# Patient Record
Sex: MALE | Race: WHITE | NOT HISPANIC OR LATINO | Employment: OTHER | URBAN - METROPOLITAN AREA
[De-identification: names, ages, dates, MRNs, and addresses within clinical notes are randomized per-mention and may not be internally consistent; named-entity substitution may affect disease eponyms.]

---

## 2017-08-03 PROBLEM — I10 ESSENTIAL HYPERTENSION: Status: ACTIVE | Noted: 2017-08-03

## 2018-04-17 ENCOUNTER — OFFICE VISIT (OUTPATIENT)
Dept: CARDIOLOGY | Facility: CLINIC | Age: 83
End: 2018-04-17
Attending: INTERNAL MEDICINE
Payer: MEDICARE

## 2018-04-17 VITALS
HEART RATE: 70 BPM | BODY MASS INDEX: 25.48 KG/M2 | HEIGHT: 70 IN | DIASTOLIC BLOOD PRESSURE: 90 MMHG | SYSTOLIC BLOOD PRESSURE: 152 MMHG | WEIGHT: 178 LBS

## 2018-04-17 DIAGNOSIS — I48.20 ATRIAL FIBRILLATION, CHRONIC: Primary | ICD-10-CM

## 2018-04-17 DIAGNOSIS — I25.10 CORONARY ARTERY DISEASE INVOLVING NATIVE CORONARY ARTERY OF NATIVE HEART WITHOUT ANGINA PECTORIS: ICD-10-CM

## 2018-04-17 DIAGNOSIS — I49.5 SSS (SICK SINUS SYNDROME): Chronic | ICD-10-CM

## 2018-04-17 DIAGNOSIS — I63.319 CEREBRAL INFARCTION DUE TO THROMBOSIS OF MIDDLE CEREBRAL ARTERY, UNSPECIFIED BLOOD VESSEL LATERALITY: ICD-10-CM

## 2018-04-17 DIAGNOSIS — I10 ESSENTIAL HYPERTENSION: ICD-10-CM

## 2018-04-17 DIAGNOSIS — M10.9 GOUT OF ANKLE, UNSPECIFIED CAUSE, UNSPECIFIED CHRONICITY, UNSPECIFIED LATERALITY: ICD-10-CM

## 2018-04-17 DIAGNOSIS — N18.4 CKD (CHRONIC KIDNEY DISEASE) STAGE 4, GFR 15-29 ML/MIN: ICD-10-CM

## 2018-04-17 DIAGNOSIS — I65.22 STENOSIS OF LEFT CAROTID ARTERY: Chronic | ICD-10-CM

## 2018-04-17 DIAGNOSIS — Z95.0 CARDIAC PACEMAKER IN SITU: ICD-10-CM

## 2018-04-17 PROCEDURE — 99213 OFFICE O/P EST LOW 20 MIN: CPT | Mod: S$GLB,,, | Performed by: INTERNAL MEDICINE

## 2018-04-17 NOTE — PROGRESS NOTES
Subjective:    Patient ID:  Dillon Dennis is a 86 y.o. male     HPI  Here for F/U of AF, SSS, pacer, HBP, CVA, CRF, gout.    I have been stressed about my wife's shingles and my daughter's kidney tumor. I feel well.  Didn't take my meds today.    Current Outpatient Prescriptions   Medication Sig    amlodipine (NORVASC) 5 MG tablet Take 1 tablet (5 mg total) by mouth once daily.    apixaban (ELIQUIS) 2.5 mg Tab Take 1 tablet (2.5 mg total) by mouth 2 (two) times daily.    ascorbic acid (VITAMIN C) 500 MG tablet Take 500 mg by mouth once daily.    aspirin (ECOTRIN) 81 MG EC tablet Take 81 mg by mouth once daily.    atorvastatin (LIPITOR) 40 MG tablet Take 1 tablet (40 mg total) by mouth once daily.    celecoxib (CELEBREX) 100 MG capsule Take 1 capsule (100 mg total) by mouth 2 (two) times daily.    cholecalciferol, vitamin D3, (VITAMIN D3) 1,000 unit capsule Take 1,000 Units by mouth once daily.    colchicine 0.6 mg tablet Take 1 tablet (0.6 mg total) by mouth 3 (three) times daily as needed.    digoxin (LANOXIN) 125 mcg tablet Take 1 tablet (125 mcg total) by mouth once daily.    diltiaZEM (DILACOR XR) 180 MG CDCR Take 1 capsule (180 mg total) by mouth once daily.    esomeprazole (NEXIUM) 20 MG capsule Take 20 mg by mouth before breakfast.    finasteride (PROSCAR) 5 mg tablet Take 1 tablet (5 mg total) by mouth once daily.    hydrocortisone valerate (WEST-ANA) 0.2 % ointment Apply topically 2 (two) times daily.    losartan (COZAAR) 50 MG tablet Take 1 tablet (50 mg total) by mouth once daily.    metoprolol succinate (TOPROL-XL) 25 MG 24 hr tablet Take 1 tablet (25 mg total) by mouth once daily.    omeprazole (PRILOSEC) 20 MG capsule     tamsulosin (FLOMAX) 0.4 mg Cp24      No current facility-administered medications for this visit.          Review of Systems   Constitution: Negative for chills, decreased appetite, fever, weight gain and weight loss.   HENT: Negative for congestion, hearing loss  "and sore throat.    Eyes: Negative for blurred vision, double vision and visual disturbance.   Cardiovascular: Negative for chest pain, claudication, dyspnea on exertion, leg swelling, palpitations and syncope.   Respiratory: Negative for cough, hemoptysis, shortness of breath, sputum production and wheezing.    Endocrine: Negative for cold intolerance and heat intolerance.   Hematologic/Lymphatic: Negative for bleeding problem. Does not bruise/bleed easily.   Skin: Negative for color change, dry skin, flushing and itching.   Musculoskeletal: Negative for back pain, joint pain and myalgias.   Gastrointestinal: Negative for abdominal pain, anorexia, constipation, diarrhea, dysphagia, nausea and vomiting.        No bleeding per rectum   Genitourinary: Negative for dysuria, flank pain, frequency, hematuria and nocturia.   Neurological: Negative for dizziness, headaches, light-headedness, loss of balance, seizures and tremors.   Psychiatric/Behavioral: Negative for altered mental status and depression.         Vitals:    04/17/18 1230   BP: (!) 152/90   Pulse: 70   Weight: 80.7 kg (178 lb)   Height: 5' 10" (1.778 m)     Objective:    Physical Exam   Constitutional: He is oriented to person, place, and time. He appears well-developed and well-nourished.   HENT:   Head: Normocephalic and atraumatic.   Right Ear: External ear normal.   Left Ear: External ear normal.   Nose: Nose normal.   Eyes: Conjunctivae and EOM are normal. Pupils are equal, round, and reactive to light. No scleral icterus.   Neck: Normal range of motion. Neck supple. No JVD present. No tracheal deviation present. No thyromegaly present.   Cardiovascular: Normal rate, regular rhythm and normal heart sounds.  Exam reveals no gallop and no friction rub.    No murmur heard.  Pulmonary/Chest: Effort normal and breath sounds normal. No respiratory distress. He has no rales. He exhibits no tenderness.   Abdominal: Soft. Bowel sounds are normal. He exhibits no " distension and no mass. There is no tenderness.   Musculoskeletal: Normal range of motion. He exhibits no edema or tenderness.   Lymphadenopathy:     He has no cervical adenopathy.   Neurological: He is alert and oriented to person, place, and time. He has normal reflexes. No cranial nerve deficit. Coordination normal.   Skin: Skin is warm and dry. No rash noted.   Psychiatric: He has a normal mood and affect. His behavior is normal.         Assessment:       1. Atrial fibrillation, chronic    2. Cardiac pacemaker in situ    3. Coronary artery disease involving native coronary artery of native heart without angina pectoris    4. Essential hypertension    5. Cerebral infarction due to thrombosis of middle cerebral artery, unspecified blood vessel laterality    6. Stenosis of left carotid artery    7. SSS (sick sinus syndrome)    8. CKD (chronic kidney disease) stage 4, GFR 15-29 ml/min    9. Gout of ankle, unspecified cause, unspecified chronicity, unspecified laterality         Plan:       Stable  Continue the same

## 2018-08-21 ENCOUNTER — OFFICE VISIT (OUTPATIENT)
Dept: CARDIOLOGY | Facility: CLINIC | Age: 83
End: 2018-08-21
Attending: INTERNAL MEDICINE
Payer: MEDICARE

## 2018-08-21 VITALS
SYSTOLIC BLOOD PRESSURE: 154 MMHG | BODY MASS INDEX: 24.48 KG/M2 | WEIGHT: 171 LBS | HEART RATE: 70 BPM | HEIGHT: 70 IN | DIASTOLIC BLOOD PRESSURE: 82 MMHG

## 2018-08-21 DIAGNOSIS — I49.5 SSS (SICK SINUS SYNDROME): Primary | ICD-10-CM

## 2018-08-21 DIAGNOSIS — N18.4 CKD (CHRONIC KIDNEY DISEASE) STAGE 4, GFR 15-29 ML/MIN: ICD-10-CM

## 2018-08-21 DIAGNOSIS — I10 ESSENTIAL HYPERTENSION: ICD-10-CM

## 2018-08-21 DIAGNOSIS — Z95.0 CARDIAC PACEMAKER IN SITU: ICD-10-CM

## 2018-08-21 DIAGNOSIS — I65.22 STENOSIS OF LEFT CAROTID ARTERY: Chronic | ICD-10-CM

## 2018-08-21 DIAGNOSIS — I48.20 ATRIAL FIBRILLATION, CHRONIC: ICD-10-CM

## 2018-08-21 DIAGNOSIS — I63.319 CEREBRAL INFARCTION DUE TO THROMBOSIS OF MIDDLE CEREBRAL ARTERY, UNSPECIFIED BLOOD VESSEL LATERALITY: ICD-10-CM

## 2018-08-21 DIAGNOSIS — I25.10 CORONARY ARTERY DISEASE INVOLVING NATIVE CORONARY ARTERY OF NATIVE HEART WITHOUT ANGINA PECTORIS: ICD-10-CM

## 2018-08-21 PROCEDURE — 99214 OFFICE O/P EST MOD 30 MIN: CPT | Mod: S$GLB,,, | Performed by: INTERNAL MEDICINE

## 2018-08-21 PROCEDURE — 93280 PM DEVICE PROGR EVAL DUAL: CPT | Mod: S$GLB,,, | Performed by: INTERNAL MEDICINE

## 2018-08-22 DIAGNOSIS — I49.5 SSS (SICK SINUS SYNDROME): ICD-10-CM

## 2018-08-22 NOTE — PROGRESS NOTES
[id number]:    Patient ID:  Dillon Dennis is a 87 y.o. male     HPI   Here for follow-up of coronary artery disease, essential hypertension, chronic atrial fibrillation, sick sinus syndrome, cardiac pacemaker in situ, chronic kidney disease, carotid occlusive disease, cerebrovascular disease.    I feel good.  Ever since I started taking digoxin, I feel so much better.  I have no shortness of breath and no palpitations.    Current Outpatient Medications   Medication Sig    amlodipine (NORVASC) 5 MG tablet Take 1 tablet (5 mg total) by mouth once daily.    apixaban (ELIQUIS) 2.5 mg Tab Take 1 tablet (2.5 mg total) by mouth 2 (two) times daily.    ascorbic acid (VITAMIN C) 500 MG tablet Take 500 mg by mouth once daily.    aspirin (ECOTRIN) 81 MG EC tablet Take 81 mg by mouth once daily.    atorvastatin (LIPITOR) 40 MG tablet Take 1 tablet (40 mg total) by mouth once daily.    celecoxib (CELEBREX) 100 MG capsule Take 1 capsule (100 mg total) by mouth 2 (two) times daily.    cholecalciferol, vitamin D3, (VITAMIN D3) 1,000 unit capsule Take 1,000 Units by mouth once daily.    colchicine 0.6 mg tablet Take 1 tablet (0.6 mg total) by mouth 3 (three) times daily as needed.    digoxin (LANOXIN) 125 mcg tablet Take 1 tablet (125 mcg total) by mouth once daily.    diltiaZEM (DILACOR XR) 180 MG CDCR Take 1 capsule (180 mg total) by mouth once daily.    esomeprazole (NEXIUM) 20 MG capsule Take 20 mg by mouth before breakfast.    finasteride (PROSCAR) 5 mg tablet Take 1 tablet (5 mg total) by mouth once daily.    hydrocortisone valerate (WEST-ANA) 0.2 % ointment Apply topically 2 (two) times daily.    losartan (COZAAR) 50 MG tablet Take 1 tablet (50 mg total) by mouth once daily.    metoprolol succinate (TOPROL-XL) 25 MG 24 hr tablet Take 1 tablet (25 mg total) by mouth once daily.    omeprazole (PRILOSEC) 20 MG capsule     tamsulosin (FLOMAX) 0.4 mg Cp24      No current facility-administered  "medications for this visit.          Review of Systems   Constitution: Negative for chills, decreased appetite, fever, weight gain and weight loss.   HENT: Negative for congestion, hearing loss and sore throat.    Eyes: Negative for blurred vision, double vision and visual disturbance.   Cardiovascular: Negative for chest pain, claudication, dyspnea on exertion, leg swelling, palpitations and syncope.   Respiratory: Negative for cough, hemoptysis, shortness of breath, sputum production and wheezing.    Endocrine: Negative for cold intolerance and heat intolerance.   Hematologic/Lymphatic: Negative for bleeding problem. Does not bruise/bleed easily.   Skin: Negative for color change, dry skin, flushing and itching.   Musculoskeletal: Negative for back pain, joint pain and myalgias.   Gastrointestinal: Negative for abdominal pain, anorexia, constipation, diarrhea, dysphagia, nausea and vomiting.        No bleeding per rectum   Genitourinary: Negative for dysuria, flank pain, frequency, hematuria and nocturia.   Neurological: Negative for dizziness, headaches, light-headedness, loss of balance, seizures and tremors.   Psychiatric/Behavioral: Negative for altered mental status and depression.         Vitals:    08/21/18 1305   BP: (!) 154/82   Pulse: 70   Weight: 77.6 kg (171 lb)   Height: 5' 10" (1.778 m)     Objective:    Physical Exam   Constitutional: He is oriented to person, place, and time. He appears well-developed and well-nourished.   HENT:   Head: Normocephalic and atraumatic.   Right Ear: External ear normal.   Left Ear: External ear normal.   Nose: Nose normal.   Eyes: Conjunctivae and EOM are normal. Pupils are equal, round, and reactive to light. No scleral icterus.   Neck: Normal range of motion. Neck supple. No JVD present. No tracheal deviation present. No thyromegaly present.   Cardiovascular: Normal rate, regular rhythm and normal heart sounds. Exam reveals no gallop and no friction rub.   No murmur " heard.  Pulmonary/Chest: Effort normal and breath sounds normal. No respiratory distress. He has no rales. He exhibits no tenderness.   Abdominal: Soft. Bowel sounds are normal. He exhibits no distension and no mass. There is no tenderness.   Musculoskeletal: Normal range of motion. He exhibits no edema or tenderness.   Lymphadenopathy:     He has no cervical adenopathy.   Neurological: He is alert and oriented to person, place, and time. He has normal reflexes. No cranial nerve deficit. Coordination normal.   Skin: Skin is warm and dry. No rash noted.   Psychiatric: He has a normal mood and affect. His behavior is normal.       pacemaker interrogated.    Assessment:       1. SSS (sick sinus syndrome)    2. Atrial fibrillation, chronic    3. Cardiac pacemaker in situ    4. Coronary artery disease involving native coronary artery of native heart without angina pectoris    5. Essential hypertension    6. CKD (chronic kidney disease) stage 4, GFR 15-29 ml/min    7. Stenosis of left carotid artery    8. Cerebral infarction due to thrombosis of middle cerebral artery, unspecified blood vessel laterality         Plan:       Stable.  Continue the present pharmacological regimen.

## 2018-11-28 ENCOUNTER — CLINICAL SUPPORT (OUTPATIENT)
Dept: CARDIOLOGY | Facility: CLINIC | Age: 83
End: 2018-11-28
Payer: MEDICARE

## 2018-11-28 DIAGNOSIS — Z95.0 CARDIAC PACEMAKER IN SITU: Primary | ICD-10-CM

## 2018-11-28 PROCEDURE — 93296 REM INTERROG EVL PM/IDS: CPT | Mod: S$GLB,,, | Performed by: INTERNAL MEDICINE

## 2018-11-28 PROCEDURE — 93294 REM INTERROG EVL PM/LDLS PM: CPT | Mod: S$GLB,,, | Performed by: INTERNAL MEDICINE

## 2018-12-04 ENCOUNTER — OFFICE VISIT (OUTPATIENT)
Dept: CARDIOLOGY | Facility: CLINIC | Age: 83
End: 2018-12-04
Attending: INTERNAL MEDICINE
Payer: MEDICARE

## 2018-12-04 VITALS
BODY MASS INDEX: 24.77 KG/M2 | HEIGHT: 70 IN | SYSTOLIC BLOOD PRESSURE: 144 MMHG | HEART RATE: 70 BPM | WEIGHT: 173 LBS | DIASTOLIC BLOOD PRESSURE: 78 MMHG

## 2018-12-04 DIAGNOSIS — Z95.0 CARDIAC PACEMAKER IN SITU: ICD-10-CM

## 2018-12-04 DIAGNOSIS — I48.20 ATRIAL FIBRILLATION, CHRONIC: ICD-10-CM

## 2018-12-04 DIAGNOSIS — I10 ESSENTIAL HYPERTENSION: Primary | ICD-10-CM

## 2018-12-04 DIAGNOSIS — I63.319 CEREBRAL INFARCTION DUE TO THROMBOSIS OF MIDDLE CEREBRAL ARTERY, UNSPECIFIED BLOOD VESSEL LATERALITY: ICD-10-CM

## 2018-12-04 DIAGNOSIS — I25.10 CORONARY ARTERY DISEASE INVOLVING NATIVE CORONARY ARTERY OF NATIVE HEART WITHOUT ANGINA PECTORIS: ICD-10-CM

## 2018-12-04 DIAGNOSIS — I65.22 STENOSIS OF LEFT CAROTID ARTERY: Chronic | ICD-10-CM

## 2018-12-04 DIAGNOSIS — I49.5 SSS (SICK SINUS SYNDROME): Chronic | ICD-10-CM

## 2018-12-04 PROCEDURE — 99213 OFFICE O/P EST LOW 20 MIN: CPT | Mod: S$GLB,,, | Performed by: INTERNAL MEDICINE

## 2018-12-04 NOTE — PROGRESS NOTES
Subjective:    Patient ID:  Dillon Dennis is a 87 y.o. male     HPI   Here for follow-up of essential hypertension, chronic atrial fibrillation, sick sinus syndrome, cardiac pacemaker in situ, coronary artery disease, carotid occlusive disease, history of previous CVA.    I feel good.  I have no complaints.    Current Outpatient Medications   Medication Sig    amlodipine (NORVASC) 5 MG tablet Take 1 tablet (5 mg total) by mouth once daily.    apixaban (ELIQUIS) 2.5 mg Tab Take 1 tablet (2.5 mg total) by mouth 2 (two) times daily.    ascorbic acid (VITAMIN C) 500 MG tablet Take 500 mg by mouth once daily.    aspirin (ECOTRIN) 81 MG EC tablet Take 81 mg by mouth once daily.    atorvastatin (LIPITOR) 40 MG tablet Take 1 tablet (40 mg total) by mouth once daily.    celecoxib (CELEBREX) 100 MG capsule Take 1 capsule (100 mg total) by mouth 2 (two) times daily.    cholecalciferol, vitamin D3, (VITAMIN D3) 1,000 unit capsule Take 1,000 Units by mouth once daily.    colchicine 0.6 mg tablet Take 1 tablet (0.6 mg total) by mouth 3 (three) times daily as needed.    digoxin (LANOXIN) 125 mcg tablet Take 1 tablet (125 mcg total) by mouth once daily.    diltiaZEM (DILACOR XR) 180 MG CDCR Take 1 capsule (180 mg total) by mouth once daily.    esomeprazole (NEXIUM) 20 MG capsule Take 20 mg by mouth before breakfast.    finasteride (PROSCAR) 5 mg tablet Take 1 tablet (5 mg total) by mouth once daily.    hydrocortisone valerate (WEST-ANA) 0.2 % ointment Apply topically 2 (two) times daily.    losartan (COZAAR) 50 MG tablet Take 1 tablet (50 mg total) by mouth once daily.    metoprolol succinate (TOPROL-XL) 25 MG 24 hr tablet Take 1 tablet (25 mg total) by mouth once daily.    omeprazole (PRILOSEC) 20 MG capsule     tamsulosin (FLOMAX) 0.4 mg Cp24      No current facility-administered medications for this visit.          Review of Systems   Constitution: Negative for chills, decreased appetite, fever, weight gain  "and weight loss.   HENT: Negative for congestion, hearing loss and sore throat.    Eyes: Negative for blurred vision, double vision and visual disturbance.   Cardiovascular: Negative for chest pain, claudication, dyspnea on exertion, leg swelling, palpitations and syncope.   Respiratory: Negative for cough, hemoptysis, shortness of breath, sputum production and wheezing.    Endocrine: Negative for cold intolerance and heat intolerance.   Hematologic/Lymphatic: Negative for bleeding problem. Does not bruise/bleed easily.   Skin: Negative for color change, dry skin, flushing and itching.   Musculoskeletal: Negative for back pain, joint pain and myalgias.   Gastrointestinal: Negative for abdominal pain, anorexia, constipation, diarrhea, dysphagia, nausea and vomiting.        No bleeding per rectum   Genitourinary: Negative for dysuria, flank pain, frequency, hematuria and nocturia.   Neurological: Negative for dizziness, headaches, light-headedness, loss of balance, seizures and tremors.   Psychiatric/Behavioral: Negative for altered mental status and depression.         Vitals:    12/04/18 1317   BP: (!) 144/78   Pulse: 70   Weight: 78.5 kg (173 lb)   Height: 5' 10" (1.778 m)     Objective:    Physical Exam   Constitutional: He is oriented to person, place, and time. He appears well-developed and well-nourished.   HENT:   Head: Normocephalic and atraumatic.   Right Ear: External ear normal.   Left Ear: External ear normal.   Nose: Nose normal.   Eyes: Conjunctivae and EOM are normal. Pupils are equal, round, and reactive to light. No scleral icterus.   Neck: Normal range of motion. Neck supple. No JVD present. No tracheal deviation present. No thyromegaly present.   Cardiovascular: Normal rate, regular rhythm and normal heart sounds. Exam reveals no gallop and no friction rub.   No murmur heard.  Pulmonary/Chest: Effort normal and breath sounds normal. No respiratory distress. He has no rales. He exhibits no " tenderness.   Abdominal: Soft. Bowel sounds are normal. He exhibits no distension and no mass. There is no tenderness.   Musculoskeletal: Normal range of motion. He exhibits no edema or tenderness.   Lymphadenopathy:     He has no cervical adenopathy.   Neurological: He is alert and oriented to person, place, and time. He has normal reflexes. No cranial nerve deficit. Coordination normal.   Skin: Skin is warm and dry. No rash noted.   Psychiatric: He has a normal mood and affect. His behavior is normal.         Assessment:       1. Essential hypertension    2. Coronary artery disease involving native coronary artery of native heart without angina pectoris    3. Atrial fibrillation, chronic    4. Cardiac pacemaker in situ    5. Stenosis of left carotid artery    6. Cerebral infarction due to thrombosis of middle cerebral artery, unspecified blood vessel laterality    7. SSS (sick sinus syndrome)         Plan:       Stable.  Continue the present pharmacological regimen.

## 2019-02-28 ENCOUNTER — CLINICAL SUPPORT (OUTPATIENT)
Dept: CARDIOLOGY | Facility: CLINIC | Age: 84
End: 2019-02-28
Payer: MEDICARE

## 2019-02-28 DIAGNOSIS — Z95.0 CARDIAC PACEMAKER IN SITU: Primary | ICD-10-CM

## 2019-02-28 PROCEDURE — 93294 REM INTERROG EVL PM/LDLS PM: CPT | Mod: S$GLB,,, | Performed by: INTERNAL MEDICINE

## 2019-02-28 PROCEDURE — 93296 PR INTERROG EVAL, REMOTE, UP TO 90 DAYS, PACER/DEFIB,TECH REVIEW: ICD-10-PCS | Mod: S$GLB,,, | Performed by: INTERNAL MEDICINE

## 2019-02-28 PROCEDURE — 93296 REM INTERROG EVL PM/IDS: CPT | Mod: S$GLB,,, | Performed by: INTERNAL MEDICINE

## 2019-02-28 PROCEDURE — 93294 PR INTERROG EVAL, REMOTE, UP TO 90 DAYS,PACEMAKER: ICD-10-PCS | Mod: S$GLB,,, | Performed by: INTERNAL MEDICINE

## 2019-04-02 ENCOUNTER — OFFICE VISIT (OUTPATIENT)
Dept: CARDIOLOGY | Facility: CLINIC | Age: 84
End: 2019-04-02
Attending: INTERNAL MEDICINE
Payer: MEDICARE

## 2019-04-02 VITALS
SYSTOLIC BLOOD PRESSURE: 150 MMHG | HEART RATE: 70 BPM | HEIGHT: 70 IN | BODY MASS INDEX: 23.77 KG/M2 | DIASTOLIC BLOOD PRESSURE: 72 MMHG | WEIGHT: 166 LBS

## 2019-04-02 DIAGNOSIS — I48.20 ATRIAL FIBRILLATION, CHRONIC: ICD-10-CM

## 2019-04-02 DIAGNOSIS — I49.5 SSS (SICK SINUS SYNDROME): Chronic | ICD-10-CM

## 2019-04-02 DIAGNOSIS — I63.319 CEREBRAL INFARCTION DUE TO THROMBOSIS OF MIDDLE CEREBRAL ARTERY, UNSPECIFIED BLOOD VESSEL LATERALITY: ICD-10-CM

## 2019-04-02 DIAGNOSIS — Z95.0 CARDIAC PACEMAKER IN SITU: ICD-10-CM

## 2019-04-02 DIAGNOSIS — I10 ESSENTIAL HYPERTENSION: Primary | ICD-10-CM

## 2019-04-02 DIAGNOSIS — I65.22 STENOSIS OF LEFT CAROTID ARTERY: Chronic | ICD-10-CM

## 2019-04-02 DIAGNOSIS — N18.4 CKD (CHRONIC KIDNEY DISEASE) STAGE 4, GFR 15-29 ML/MIN: ICD-10-CM

## 2019-04-02 DIAGNOSIS — I25.10 CORONARY ARTERY DISEASE INVOLVING NATIVE CORONARY ARTERY OF NATIVE HEART WITHOUT ANGINA PECTORIS: ICD-10-CM

## 2019-04-02 PROCEDURE — 99213 PR OFFICE/OUTPT VISIT, EST, LEVL III, 20-29 MIN: ICD-10-PCS | Mod: S$GLB,,, | Performed by: INTERNAL MEDICINE

## 2019-04-02 PROCEDURE — 99213 OFFICE O/P EST LOW 20 MIN: CPT | Mod: S$GLB,,, | Performed by: INTERNAL MEDICINE

## 2019-04-02 NOTE — PROGRESS NOTES
Subjective:    Patient ID:  Dillon Dennis is a 87 y.o. male     HPI   Here for follow-up of hypertension, coronary artery disease, carotid occlusive disease, chronic atrial fibrillation, sick sinus syndrome, cardiac pacemaker, cerebral infarction, chronic kidney disease stage 4.    I am feeling well, have no complaints.  I stay active.    Current Outpatient Medications   Medication Sig    amlodipine (NORVASC) 5 MG tablet Take 1 tablet (5 mg total) by mouth once daily.    apixaban (ELIQUIS) 2.5 mg Tab Take 1 tablet (2.5 mg total) by mouth 2 (two) times daily.    ascorbic acid (VITAMIN C) 500 MG tablet Take 500 mg by mouth once daily.    aspirin (ECOTRIN) 81 MG EC tablet Take 81 mg by mouth once daily.    atorvastatin (LIPITOR) 40 MG tablet Take 1 tablet (40 mg total) by mouth once daily.    celecoxib (CELEBREX) 100 MG capsule Take 1 capsule (100 mg total) by mouth 2 (two) times daily.    cholecalciferol, vitamin D3, (VITAMIN D3) 1,000 unit capsule Take 1,000 Units by mouth once daily.    colchicine 0.6 mg tablet Take 1 tablet (0.6 mg total) by mouth 3 (three) times daily as needed.    digoxin (LANOXIN) 125 mcg tablet Take 1 tablet (125 mcg total) by mouth once daily.    diltiaZEM (DILACOR XR) 180 MG CDCR Take 1 capsule (180 mg total) by mouth once daily.    esomeprazole (NEXIUM) 20 MG capsule Take 20 mg by mouth before breakfast.    finasteride (PROSCAR) 5 mg tablet Take 1 tablet (5 mg total) by mouth once daily.    hydrocortisone valerate (WEST-ANA) 0.2 % ointment Apply topically 2 (two) times daily.    losartan (COZAAR) 50 MG tablet Take 1 tablet (50 mg total) by mouth once daily.    metoprolol succinate (TOPROL-XL) 25 MG 24 hr tablet Take 1 tablet (25 mg total) by mouth once daily.    omeprazole (PRILOSEC) 20 MG capsule     tamsulosin (FLOMAX) 0.4 mg Cp24      No current facility-administered medications for this visit.          Review of Systems   Constitution: Negative for chills, decreased  "appetite, fever, weight gain and weight loss.   HENT: Negative for congestion, hearing loss and sore throat.    Eyes: Negative for blurred vision, double vision and visual disturbance.   Cardiovascular: Negative for chest pain, claudication, dyspnea on exertion, leg swelling, palpitations and syncope.   Respiratory: Negative for cough, hemoptysis, shortness of breath, sputum production and wheezing.    Endocrine: Negative for cold intolerance and heat intolerance.   Hematologic/Lymphatic: Negative for bleeding problem. Does not bruise/bleed easily.   Skin: Negative for color change, dry skin, flushing and itching.   Musculoskeletal: Negative for back pain, joint pain and myalgias.   Gastrointestinal: Negative for abdominal pain, anorexia, constipation, diarrhea, dysphagia, nausea and vomiting.        No bleeding per rectum   Genitourinary: Negative for dysuria, flank pain, frequency, hematuria and nocturia.   Neurological: Negative for dizziness, headaches, light-headedness, loss of balance, seizures and tremors.   Psychiatric/Behavioral: Negative for altered mental status and depression.         Vitals:    04/02/19 1415   BP: (!) 150/72   Pulse: 70   Weight: 75.3 kg (166 lb)   Height: 5' 10" (1.778 m)     Objective:    Physical Exam   Constitutional: He is oriented to person, place, and time. He appears well-developed and well-nourished.   HENT:   Head: Normocephalic and atraumatic.   Right Ear: External ear normal.   Left Ear: External ear normal.   Nose: Nose normal.   Eyes: Pupils are equal, round, and reactive to light. Conjunctivae and EOM are normal. No scleral icterus.   Neck: Normal range of motion. Neck supple. No JVD present. No tracheal deviation present. No thyromegaly present.   Cardiovascular: Normal rate, regular rhythm and normal heart sounds. Exam reveals no gallop and no friction rub.   No murmur heard.  Pulmonary/Chest: Effort normal and breath sounds normal. No respiratory distress. He has no " rales. He exhibits no tenderness.   Abdominal: Soft. Bowel sounds are normal. He exhibits no distension and no mass. There is no tenderness.   Musculoskeletal: Normal range of motion. He exhibits no edema or tenderness.   Lymphadenopathy:     He has no cervical adenopathy.   Neurological: He is alert and oriented to person, place, and time. He has normal reflexes. No cranial nerve deficit. Coordination normal.   Skin: Skin is warm and dry. No rash noted.   Psychiatric: He has a normal mood and affect. His behavior is normal.         Assessment:       1. Essential hypertension    2. Coronary artery disease involving native coronary artery of native heart without angina pectoris    3. Stenosis of left carotid artery    4. Atrial fibrillation, chronic    5. Cardiac pacemaker in situ    6. Cerebral infarction due to thrombosis of middle cerebral artery, unspecified blood vessel laterality    7. SSS (sick sinus syndrome)    8. CKD (chronic kidney disease) stage 4, GFR 15-29 ml/min         Plan:       Stable from a cardiovascular standpoint.  Continue the present pharmacological regimen.

## 2019-05-28 ENCOUNTER — CLINICAL SUPPORT (OUTPATIENT)
Dept: CARDIOLOGY | Facility: CLINIC | Age: 84
End: 2019-05-28
Payer: MEDICARE

## 2019-05-28 DIAGNOSIS — Z95.0 CARDIAC PACEMAKER IN SITU: Primary | ICD-10-CM

## 2019-05-28 PROCEDURE — 93296 REM INTERROG EVL PM/IDS: CPT | Mod: S$GLB,,, | Performed by: INTERNAL MEDICINE

## 2019-05-28 PROCEDURE — 93294 PR INTERROG EVAL, REMOTE, UP TO 90 DAYS,PACEMAKER: ICD-10-PCS | Mod: S$GLB,,, | Performed by: INTERNAL MEDICINE

## 2019-05-28 PROCEDURE — 93296 PR INTERROG EVAL, REMOTE, UP TO 90 DAYS, PACER/DEFIB,TECH REVIEW: ICD-10-PCS | Mod: S$GLB,,, | Performed by: INTERNAL MEDICINE

## 2019-05-28 PROCEDURE — 93294 REM INTERROG EVL PM/LDLS PM: CPT | Mod: S$GLB,,, | Performed by: INTERNAL MEDICINE

## 2019-06-10 DIAGNOSIS — I49.5 SSS (SICK SINUS SYNDROME): ICD-10-CM

## 2019-06-10 DIAGNOSIS — I48.0 PAROXYSMAL ATRIAL FIBRILLATION: ICD-10-CM

## 2019-06-10 RX ORDER — DILTIAZEM HYDROCHLORIDE 180 MG/1
180 CAPSULE, EXTENDED RELEASE ORAL DAILY
Qty: 30 CAPSULE | Refills: 6 | Status: SHIPPED | OUTPATIENT
Start: 2019-06-10

## 2019-06-10 RX ORDER — DILTIAZEM HYDROCHLORIDE 180 MG/1
180 CAPSULE, EXTENDED RELEASE ORAL DAILY
Qty: 30 CAPSULE | Refills: 6 | Status: SHIPPED | OUTPATIENT
Start: 2019-06-10 | End: 2019-06-10 | Stop reason: SDUPTHER

## 2019-08-26 ENCOUNTER — OFFICE VISIT (OUTPATIENT)
Dept: CARDIOLOGY | Facility: CLINIC | Age: 84
End: 2019-08-26
Attending: INTERNAL MEDICINE
Payer: MEDICARE

## 2019-08-26 VITALS
SYSTOLIC BLOOD PRESSURE: 140 MMHG | HEIGHT: 70 IN | DIASTOLIC BLOOD PRESSURE: 72 MMHG | HEART RATE: 70 BPM | BODY MASS INDEX: 22.48 KG/M2 | WEIGHT: 157 LBS

## 2019-08-26 DIAGNOSIS — I25.10 CORONARY ARTERY DISEASE INVOLVING NATIVE CORONARY ARTERY OF NATIVE HEART WITHOUT ANGINA PECTORIS: ICD-10-CM

## 2019-08-26 DIAGNOSIS — I49.5 SSS (SICK SINUS SYNDROME): Primary | Chronic | ICD-10-CM

## 2019-08-26 DIAGNOSIS — I48.20 ATRIAL FIBRILLATION, CHRONIC: ICD-10-CM

## 2019-08-26 DIAGNOSIS — I10 ESSENTIAL HYPERTENSION: ICD-10-CM

## 2019-08-26 DIAGNOSIS — Z95.0 CARDIAC PACEMAKER IN SITU: ICD-10-CM

## 2019-08-26 DIAGNOSIS — N18.4 CKD (CHRONIC KIDNEY DISEASE) STAGE 4, GFR 15-29 ML/MIN: ICD-10-CM

## 2019-08-26 DIAGNOSIS — I63.319 CEREBRAL INFARCTION DUE TO THROMBOSIS OF MIDDLE CEREBRAL ARTERY, UNSPECIFIED BLOOD VESSEL LATERALITY: ICD-10-CM

## 2019-08-26 PROCEDURE — 93000 ELECTROCARDIOGRAM COMPLETE: CPT | Mod: S$GLB,,, | Performed by: INTERNAL MEDICINE

## 2019-08-26 PROCEDURE — 99213 PR OFFICE/OUTPT VISIT, EST, LEVL III, 20-29 MIN: ICD-10-PCS | Mod: S$GLB,,, | Performed by: INTERNAL MEDICINE

## 2019-08-26 PROCEDURE — 99213 OFFICE O/P EST LOW 20 MIN: CPT | Mod: S$GLB,,, | Performed by: INTERNAL MEDICINE

## 2019-08-26 PROCEDURE — 93000 PR ELECTROCARDIOGRAM, COMPLETE: ICD-10-PCS | Mod: S$GLB,,, | Performed by: INTERNAL MEDICINE

## 2019-08-26 NOTE — PROGRESS NOTES
Subjective:    Patient ID:  Dillon Dennis is a 88 y.o. male     HPI   Here for follow-up of sick sinus syndrome, chronic atrial fibrillation, cardiac pacemaker in situ, essential hypertension, coronary artery disease, history of cerebral infarction, stage IV chronic renal failure.    I feel well, have no complaints.  I have intentionally reduced my portions to half, to lose some weight.    Current Outpatient Medications   Medication Sig    amlodipine (NORVASC) 5 MG tablet Take 1 tablet (5 mg total) by mouth once daily.    apixaban (ELIQUIS) 2.5 mg Tab Take 1 tablet (2.5 mg total) by mouth 2 (two) times daily.    ascorbic acid (VITAMIN C) 500 MG tablet Take 500 mg by mouth once daily.    aspirin (ECOTRIN) 81 MG EC tablet Take 81 mg by mouth once daily.    atorvastatin (LIPITOR) 40 MG tablet Take 1 tablet (40 mg total) by mouth once daily.    celecoxib (CELEBREX) 100 MG capsule Take 1 capsule (100 mg total) by mouth 2 (two) times daily.    cholecalciferol, vitamin D3, (VITAMIN D3) 1,000 unit capsule Take 1,000 Units by mouth once daily.    colchicine 0.6 mg tablet Take 1 tablet (0.6 mg total) by mouth 3 (three) times daily as needed.    digoxin (LANOXIN) 125 mcg tablet Take 1 tablet (125 mcg total) by mouth once daily.    diltiaZEM (DILACOR XR) 180 MG CDCR Take 1 capsule (180 mg total) by mouth once daily.    esomeprazole (NEXIUM) 20 MG capsule Take 20 mg by mouth before breakfast.    finasteride (PROSCAR) 5 mg tablet Take 1 tablet (5 mg total) by mouth once daily.    hydrocortisone valerate (WEST-ANA) 0.2 % ointment Apply topically 2 (two) times daily.    losartan (COZAAR) 50 MG tablet Take 1 tablet (50 mg total) by mouth once daily.    metoprolol succinate (TOPROL-XL) 25 MG 24 hr tablet Take 1 tablet (25 mg total) by mouth once daily.    omeprazole (PRILOSEC) 20 MG capsule     tamsulosin (FLOMAX) 0.4 mg Cp24      No current facility-administered medications for this visit.          Review of  "Systems   Constitution: Positive for weight loss. Negative for chills, decreased appetite, fever and weight gain.   HENT: Negative for congestion, hearing loss and sore throat.    Eyes: Negative for blurred vision, double vision and visual disturbance.   Cardiovascular: Negative for chest pain, claudication, dyspnea on exertion, leg swelling, palpitations and syncope.   Respiratory: Negative for cough, hemoptysis, shortness of breath, sputum production and wheezing.    Endocrine: Negative for cold intolerance and heat intolerance.   Hematologic/Lymphatic: Negative for bleeding problem. Does not bruise/bleed easily.   Skin: Negative for color change, dry skin, flushing and itching.   Musculoskeletal: Negative for back pain, joint pain and myalgias.   Gastrointestinal: Negative for abdominal pain, anorexia, constipation, diarrhea, dysphagia, nausea and vomiting.        No bleeding per rectum   Genitourinary: Negative for dysuria, flank pain, frequency, hematuria and nocturia.   Neurological: Negative for dizziness, headaches, light-headedness, loss of balance, seizures and tremors.   Psychiatric/Behavioral: Negative for altered mental status and depression.         Vitals:    08/26/19 1210   BP: (!) 140/72   Pulse: 70   Weight: 71.2 kg (157 lb)   Height: 5' 10" (1.778 m)     Objective:    Physical Exam   Constitutional: He is oriented to person, place, and time. He appears well-developed and well-nourished.   HENT:   Head: Normocephalic and atraumatic.   Right Ear: External ear normal.   Left Ear: External ear normal.   Nose: Nose normal.   Eyes: Pupils are equal, round, and reactive to light. Conjunctivae and EOM are normal. No scleral icterus.   Neck: Normal range of motion. Neck supple. No JVD present. No tracheal deviation present. No thyromegaly present.   Cardiovascular: Normal rate, regular rhythm and normal heart sounds. Exam reveals no gallop and no friction rub.   No murmur heard.  Pulmonary/Chest: Effort " normal and breath sounds normal. No respiratory distress. He has no rales. He exhibits no tenderness.   Abdominal: Soft. Bowel sounds are normal. He exhibits no distension and no mass. There is no tenderness.   Musculoskeletal: Normal range of motion. He exhibits no edema or tenderness.   Lymphadenopathy:     He has no cervical adenopathy.   Neurological: He is alert and oriented to person, place, and time. He has normal reflexes. No cranial nerve deficit. Coordination normal.   Skin: Skin is warm and dry. No rash noted.   Psychiatric: He has a normal mood and affect. His behavior is normal.         Assessment:       1. SSS (sick sinus syndrome)    2. Atrial fibrillation, chronic    3. Cardiac pacemaker in situ    4. Essential hypertension    5. Coronary artery disease involving native coronary artery of native heart without angina pectoris    6. Cerebral infarction due to thrombosis of middle cerebral artery, unspecified blood vessel laterality    7. CKD (chronic kidney disease) stage 4, GFR 15-29 ml/min         Plan:       Will check digoxin level, and renal function studies.  Continue present pharmacological regimen. (may consider stopping digoxin)

## 2019-08-28 ENCOUNTER — CLINICAL SUPPORT (OUTPATIENT)
Dept: CARDIOLOGY | Facility: CLINIC | Age: 84
End: 2019-08-28
Payer: MEDICARE

## 2019-08-28 DIAGNOSIS — Z95.0 CARDIAC PACEMAKER IN SITU: Primary | ICD-10-CM

## 2019-08-28 PROCEDURE — 93296 PR INTERROG EVAL, REMOTE, UP TO 90 DAYS, PACER/DEFIB,TECH REVIEW: ICD-10-PCS | Mod: S$GLB,,, | Performed by: INTERNAL MEDICINE

## 2019-08-28 PROCEDURE — 93296 REM INTERROG EVL PM/IDS: CPT | Mod: S$GLB,,, | Performed by: INTERNAL MEDICINE

## 2019-08-28 PROCEDURE — 93294 REM INTERROG EVL PM/LDLS PM: CPT | Mod: S$GLB,,, | Performed by: INTERNAL MEDICINE

## 2019-08-28 PROCEDURE — 93294 PR INTERROG EVAL, REMOTE, UP TO 90 DAYS,PACEMAKER: ICD-10-PCS | Mod: S$GLB,,, | Performed by: INTERNAL MEDICINE

## 2019-09-13 DIAGNOSIS — Z95.0 CARDIAC PACEMAKER IN SITU: ICD-10-CM

## 2019-09-13 DIAGNOSIS — I49.5 SSS (SICK SINUS SYNDROME): Primary | ICD-10-CM

## 2019-12-17 ENCOUNTER — OFFICE VISIT (OUTPATIENT)
Dept: CARDIOLOGY | Facility: CLINIC | Age: 84
End: 2019-12-17
Attending: INTERNAL MEDICINE
Payer: MEDICARE

## 2019-12-17 VITALS
WEIGHT: 155 LBS | SYSTOLIC BLOOD PRESSURE: 135 MMHG | HEART RATE: 70 BPM | DIASTOLIC BLOOD PRESSURE: 81 MMHG | BODY MASS INDEX: 22.19 KG/M2 | HEIGHT: 70 IN

## 2019-12-17 DIAGNOSIS — I65.22 STENOSIS OF LEFT CAROTID ARTERY: Chronic | ICD-10-CM

## 2019-12-17 DIAGNOSIS — I25.10 CORONARY ARTERY DISEASE INVOLVING NATIVE CORONARY ARTERY OF NATIVE HEART WITHOUT ANGINA PECTORIS: Primary | ICD-10-CM

## 2019-12-17 DIAGNOSIS — N18.4 CKD (CHRONIC KIDNEY DISEASE) STAGE 4, GFR 15-29 ML/MIN: ICD-10-CM

## 2019-12-17 DIAGNOSIS — Z95.0 CARDIAC PACEMAKER IN SITU: ICD-10-CM

## 2019-12-17 DIAGNOSIS — I10 ESSENTIAL HYPERTENSION: ICD-10-CM

## 2019-12-17 DIAGNOSIS — I48.20 ATRIAL FIBRILLATION, CHRONIC: ICD-10-CM

## 2019-12-17 DIAGNOSIS — I63.319 CEREBRAL INFARCTION DUE TO THROMBOSIS OF MIDDLE CEREBRAL ARTERY, UNSPECIFIED BLOOD VESSEL LATERALITY: ICD-10-CM

## 2019-12-17 DIAGNOSIS — I49.5 SSS (SICK SINUS SYNDROME): Chronic | ICD-10-CM

## 2019-12-17 DIAGNOSIS — I49.5 SSS (SICK SINUS SYNDROME): ICD-10-CM

## 2019-12-17 PROCEDURE — 99214 OFFICE O/P EST MOD 30 MIN: CPT | Mod: S$GLB,,, | Performed by: INTERNAL MEDICINE

## 2019-12-17 PROCEDURE — 93280 PM DEVICE PROGR EVAL DUAL: CPT | Mod: S$GLB,,, | Performed by: INTERNAL MEDICINE

## 2019-12-17 PROCEDURE — 1159F MED LIST DOCD IN RCRD: CPT | Mod: S$GLB,,, | Performed by: INTERNAL MEDICINE

## 2019-12-17 PROCEDURE — 1159F PR MEDICATION LIST DOCUMENTED IN MEDICAL RECORD: ICD-10-PCS | Mod: S$GLB,,, | Performed by: INTERNAL MEDICINE

## 2019-12-17 PROCEDURE — 99214 PR OFFICE/OUTPT VISIT, EST, LEVL IV, 30-39 MIN: ICD-10-PCS | Mod: S$GLB,,, | Performed by: INTERNAL MEDICINE

## 2019-12-17 PROCEDURE — 93280 CARDIAC DEVICE CHECK - IN CLINIC & HOSPITAL: ICD-10-PCS | Mod: S$GLB,,, | Performed by: INTERNAL MEDICINE

## 2019-12-17 NOTE — PROGRESS NOTES
Subjective:    Patient ID:  Dillon Dennis is a 88 y.o. male     HPI  Here for follow-up of sick sinus syndrome, chronic atrial fibrillation, cardiac pacemaker in situ, essential hypertension, coronary artery disease, history of cerebral infarction, stage IV chronic renal failure.   I got on a strict diet, and lost some weight, in now I feel so much better.  I can now time my shoe laces without getting short of breath.    Current Outpatient Medications   Medication Sig    amlodipine (NORVASC) 5 MG tablet Take 1 tablet (5 mg total) by mouth once daily.    apixaban (ELIQUIS) 2.5 mg Tab Take 1 tablet (2.5 mg total) by mouth 2 (two) times daily.    ascorbic acid (VITAMIN C) 500 MG tablet Take 500 mg by mouth once daily.    aspirin (ECOTRIN) 81 MG EC tablet Take 81 mg by mouth once daily.    atorvastatin (LIPITOR) 40 MG tablet Take 1 tablet (40 mg total) by mouth once daily.    celecoxib (CELEBREX) 100 MG capsule Take 1 capsule (100 mg total) by mouth 2 (two) times daily.    cholecalciferol, vitamin D3, (VITAMIN D3) 1,000 unit capsule Take 1,000 Units by mouth once daily.    colchicine 0.6 mg tablet Take 1 tablet (0.6 mg total) by mouth 3 (three) times daily as needed.    digoxin (LANOXIN) 125 mcg tablet Take 1 tablet (125 mcg total) by mouth once daily.    diltiaZEM (DILACOR XR) 180 MG CDCR Take 1 capsule (180 mg total) by mouth once daily.    esomeprazole (NEXIUM) 20 MG capsule Take 20 mg by mouth before breakfast.    finasteride (PROSCAR) 5 mg tablet Take 1 tablet (5 mg total) by mouth once daily.    hydrocortisone valerate (WEST-ANA) 0.2 % ointment Apply topically 2 (two) times daily.    losartan (COZAAR) 50 MG tablet Take 1 tablet (50 mg total) by mouth once daily.    metoprolol succinate (TOPROL-XL) 25 MG 24 hr tablet Take 1 tablet (25 mg total) by mouth once daily.    omeprazole (PRILOSEC) 20 MG capsule     tamsulosin (FLOMAX) 0.4 mg Cp24      No current facility-administered medications for  "this visit.        Review of Systems   Constitution: Positive for weight loss. Negative for chills, decreased appetite, fever and weight gain.   HENT: Negative for congestion, hearing loss and sore throat.    Eyes: Negative for blurred vision, double vision and visual disturbance.   Cardiovascular: Negative for chest pain, claudication, dyspnea on exertion, leg swelling, palpitations and syncope.   Respiratory: Negative for cough, hemoptysis, shortness of breath, sputum production and wheezing.    Endocrine: Negative for cold intolerance and heat intolerance.   Hematologic/Lymphatic: Negative for bleeding problem. Does not bruise/bleed easily.   Skin: Negative for color change, dry skin, flushing and itching.   Musculoskeletal: Negative for back pain, joint pain and myalgias.   Gastrointestinal: Negative for abdominal pain, anorexia, constipation, diarrhea, dysphagia, nausea and vomiting.        No bleeding per rectum   Genitourinary: Negative for dysuria, flank pain, frequency, hematuria and nocturia.   Neurological: Negative for dizziness, headaches, light-headedness, loss of balance, seizures and tremors.   Psychiatric/Behavioral: Negative for altered mental status and depression.         Vitals:    12/17/19 1339   BP: 135/81   Pulse: 70   Weight: 70.3 kg (155 lb)   Height: 5' 10" (1.778 m)     Objective:    Physical Exam   Constitutional: He is oriented to person, place, and time. He appears well-developed and well-nourished.   HENT:   Head: Normocephalic and atraumatic.   Right Ear: External ear normal.   Left Ear: External ear normal.   Nose: Nose normal.   Eyes: Pupils are equal, round, and reactive to light. Conjunctivae and EOM are normal. No scleral icterus.   Neck: Normal range of motion. Neck supple. No JVD present. No tracheal deviation present. No thyromegaly present.   Cardiovascular: Normal rate, regular rhythm and normal heart sounds. Exam reveals no gallop and no friction rub.   No murmur " heard.  Pulmonary/Chest: Effort normal and breath sounds normal. No respiratory distress. He has no rales. He exhibits no tenderness.   Abdominal: Soft. Bowel sounds are normal. He exhibits no distension and no mass. There is no tenderness.   Musculoskeletal: Normal range of motion. He exhibits no edema or tenderness.   Lymphadenopathy:     He has no cervical adenopathy.   Neurological: He is alert and oriented to person, place, and time. He has normal reflexes. No cranial nerve deficit. Coordination normal.   Skin: Skin is warm and dry. No rash noted.   Psychiatric: He has a normal mood and affect. His behavior is normal.       pacemaker interrogated.    Assessment:       1. Coronary artery disease involving native coronary artery of native heart without angina pectoris    2. Essential hypertension    3. SSS (sick sinus syndrome)    4. Atrial fibrillation, chronic    5. Cardiac pacemaker in situ    6. Stenosis of left carotid artery    7. Cerebral infarction due to thrombosis of middle cerebral artery, unspecified blood vessel laterality    8. CKD (chronic kidney disease) stage 4, GFR 15-29 ml/min         Plan:       Cardiac-wise stable.  Continue the present pharmacological regimen.

## 2020-12-03 ENCOUNTER — HOSPITAL ENCOUNTER (OUTPATIENT)
Facility: OTHER | Age: 85
Discharge: HOME HEALTH | End: 2020-12-05
Attending: EMERGENCY MEDICINE | Admitting: INTERNAL MEDICINE
Payer: MEDICARE

## 2020-12-03 ENCOUNTER — TELEPHONE (OUTPATIENT)
Dept: EMERGENCY MEDICINE | Facility: HOSPITAL | Age: 85
End: 2020-12-03

## 2020-12-03 DIAGNOSIS — G60.3 IDIOPATHIC PROGRESSIVE POLYNEUROPATHY: ICD-10-CM

## 2020-12-03 DIAGNOSIS — R42 DIZZINESS: Primary | ICD-10-CM

## 2020-12-03 DIAGNOSIS — R42 DIZZINESS AND GIDDINESS: ICD-10-CM

## 2020-12-03 LAB
ALBUMIN SERPL BCP-MCNC: 4.1 G/DL (ref 3.5–5.2)
ALP SERPL-CCNC: 105 U/L (ref 55–135)
ALT SERPL W/O P-5'-P-CCNC: 18 U/L (ref 10–44)
ANION GAP SERPL CALC-SCNC: 7 MMOL/L (ref 8–16)
AST SERPL-CCNC: 28 U/L (ref 10–40)
BASOPHILS # BLD AUTO: 0.04 K/UL (ref 0–0.2)
BASOPHILS NFR BLD: 0.5 % (ref 0–1.9)
BILIRUB SERPL-MCNC: 1 MG/DL (ref 0.1–1)
BUN SERPL-MCNC: 34 MG/DL (ref 8–23)
CALCIUM SERPL-MCNC: 9 MG/DL (ref 8.7–10.5)
CHLORIDE SERPL-SCNC: 111 MMOL/L (ref 95–110)
CO2 SERPL-SCNC: 22 MMOL/L (ref 23–29)
CREAT SERPL-MCNC: 1.8 MG/DL (ref 0.5–1.4)
CTP QC/QA: YES
DIFFERENTIAL METHOD: ABNORMAL
EOSINOPHIL # BLD AUTO: 0.4 K/UL (ref 0–0.5)
EOSINOPHIL NFR BLD: 5.3 % (ref 0–8)
ERYTHROCYTE [DISTWIDTH] IN BLOOD BY AUTOMATED COUNT: 13.9 % (ref 11.5–14.5)
EST. GFR  (AFRICAN AMERICAN): 38 ML/MIN/1.73 M^2
EST. GFR  (NON AFRICAN AMERICAN): 33 ML/MIN/1.73 M^2
GLUCOSE SERPL-MCNC: 107 MG/DL (ref 70–110)
HCT VFR BLD AUTO: 35.8 % (ref 40–54)
HGB BLD-MCNC: 11.6 G/DL (ref 14–18)
IMM GRANULOCYTES # BLD AUTO: 0.01 K/UL (ref 0–0.04)
IMM GRANULOCYTES NFR BLD AUTO: 0.1 % (ref 0–0.5)
LYMPHOCYTES # BLD AUTO: 1.6 K/UL (ref 1–4.8)
LYMPHOCYTES NFR BLD: 20.2 % (ref 18–48)
MCH RBC QN AUTO: 29.3 PG (ref 27–31)
MCHC RBC AUTO-ENTMCNC: 32.4 G/DL (ref 32–36)
MCV RBC AUTO: 90 FL (ref 82–98)
MONOCYTES # BLD AUTO: 0.7 K/UL (ref 0.3–1)
MONOCYTES NFR BLD: 9 % (ref 4–15)
NEUTROPHILS # BLD AUTO: 5.2 K/UL (ref 1.8–7.7)
NEUTROPHILS NFR BLD: 64.9 % (ref 38–73)
NRBC BLD-RTO: 0 /100 WBC
PLATELET # BLD AUTO: 200 K/UL (ref 150–350)
PMV BLD AUTO: 9.3 FL (ref 9.2–12.9)
POCT GLUCOSE: 115 MG/DL (ref 70–110)
POTASSIUM SERPL-SCNC: 4.5 MMOL/L (ref 3.5–5.1)
PROT SERPL-MCNC: 7.8 G/DL (ref 6–8.4)
RBC # BLD AUTO: 3.96 M/UL (ref 4.6–6.2)
SARS-COV-2 RDRP RESP QL NAA+PROBE: NEGATIVE
SODIUM SERPL-SCNC: 140 MMOL/L (ref 136–145)
TROPONIN I SERPL DL<=0.01 NG/ML-MCNC: 0.04 NG/ML (ref 0–0.03)
TSH SERPL DL<=0.005 MIU/L-ACNC: 1.59 UIU/ML (ref 0.4–4)
WBC # BLD AUTO: 7.99 K/UL (ref 3.9–12.7)

## 2020-12-03 PROCEDURE — 80061 LIPID PANEL: CPT

## 2020-12-03 PROCEDURE — 93010 EKG 12-LEAD: ICD-10-PCS | Mod: ,,, | Performed by: INTERNAL MEDICINE

## 2020-12-03 PROCEDURE — 80053 COMPREHEN METABOLIC PANEL: CPT

## 2020-12-03 PROCEDURE — 93005 ELECTROCARDIOGRAM TRACING: CPT

## 2020-12-03 PROCEDURE — G0378 HOSPITAL OBSERVATION PER HR: HCPCS

## 2020-12-03 PROCEDURE — 25000003 PHARM REV CODE 250: Performed by: EMERGENCY MEDICINE

## 2020-12-03 PROCEDURE — 85025 COMPLETE CBC W/AUTO DIFF WBC: CPT

## 2020-12-03 PROCEDURE — 99285 EMERGENCY DEPT VISIT HI MDM: CPT | Mod: 25

## 2020-12-03 PROCEDURE — 36415 COLL VENOUS BLD VENIPUNCTURE: CPT

## 2020-12-03 PROCEDURE — U0002 COVID-19 LAB TEST NON-CDC: HCPCS | Performed by: NURSE PRACTITIONER

## 2020-12-03 PROCEDURE — 82962 GLUCOSE BLOOD TEST: CPT

## 2020-12-03 PROCEDURE — 94761 N-INVAS EAR/PLS OXIMETRY MLT: CPT

## 2020-12-03 PROCEDURE — 93010 ELECTROCARDIOGRAM REPORT: CPT | Mod: ,,, | Performed by: INTERNAL MEDICINE

## 2020-12-03 PROCEDURE — 84443 ASSAY THYROID STIM HORMONE: CPT

## 2020-12-03 PROCEDURE — 84484 ASSAY OF TROPONIN QUANT: CPT

## 2020-12-03 PROCEDURE — 84484 ASSAY OF TROPONIN QUANT: CPT | Mod: 91

## 2020-12-03 RX ORDER — AMLODIPINE BESYLATE 5 MG/1
5 TABLET ORAL DAILY
Status: DISCONTINUED | OUTPATIENT
Start: 2020-12-03 | End: 2020-12-04

## 2020-12-03 RX ORDER — DIGOXIN 125 MCG
125 TABLET ORAL DAILY
Status: DISCONTINUED | OUTPATIENT
Start: 2020-12-04 | End: 2020-12-05 | Stop reason: HOSPADM

## 2020-12-03 RX ORDER — FINASTERIDE 5 MG/1
5 TABLET, FILM COATED ORAL DAILY
Status: DISCONTINUED | OUTPATIENT
Start: 2020-12-04 | End: 2020-12-05 | Stop reason: HOSPADM

## 2020-12-03 RX ORDER — SODIUM CHLORIDE 0.9 % (FLUSH) 0.9 %
10 SYRINGE (ML) INJECTION
Status: DISCONTINUED | OUTPATIENT
Start: 2020-12-03 | End: 2020-12-05 | Stop reason: HOSPADM

## 2020-12-03 RX ORDER — TALC
6 POWDER (GRAM) TOPICAL NIGHTLY PRN
Status: DISCONTINUED | OUTPATIENT
Start: 2020-12-03 | End: 2020-12-05 | Stop reason: HOSPADM

## 2020-12-03 RX ORDER — TAMSULOSIN HYDROCHLORIDE 0.4 MG/1
0.4 CAPSULE ORAL DAILY
COMMUNITY

## 2020-12-03 RX ORDER — TAMSULOSIN HYDROCHLORIDE 0.4 MG/1
0.4 CAPSULE ORAL DAILY
Status: DISCONTINUED | OUTPATIENT
Start: 2020-12-04 | End: 2020-12-05 | Stop reason: HOSPADM

## 2020-12-03 RX ORDER — ATORVASTATIN CALCIUM 20 MG/1
40 TABLET, FILM COATED ORAL NIGHTLY
Status: DISCONTINUED | OUTPATIENT
Start: 2020-12-03 | End: 2020-12-05 | Stop reason: HOSPADM

## 2020-12-03 RX ORDER — DIGOXIN 125 MCG
125 TABLET ORAL DAILY
Status: ON HOLD | COMMUNITY
End: 2022-01-01 | Stop reason: HOSPADM

## 2020-12-03 RX ADMIN — APIXABAN 2.5 MG: 2.5 TABLET, FILM COATED ORAL at 10:12

## 2020-12-03 RX ADMIN — ATORVASTATIN CALCIUM 40 MG: 20 TABLET, FILM COATED ORAL at 10:12

## 2020-12-03 RX ADMIN — SODIUM CHLORIDE 500 ML: 0.9 INJECTION, SOLUTION INTRAVENOUS at 07:12

## 2020-12-03 RX ADMIN — AMLODIPINE BESYLATE 5 MG: 5 TABLET ORAL at 10:12

## 2020-12-03 NOTE — FIRST PROVIDER EVALUATION
Emergency Department TeleTriage Encounter Note      CHIEF COMPLAINT    Chief Complaint   Patient presents with    Transient Ischemic Attack     Son with patient states that he had a stroke last night.  Patient was seen at [facility] and they did a CT which showed a TIA.  MD is Howard.  Patient has had increased fatigue.  Bilater equal , no facial droop       VITAL SIGNS   Initial Vitals [12/03/20 1640]   BP Pulse Resp Temp SpO2   (!) 186/84 70 20 97.3 °F (36.3 °C) 100 %      MAP       --            ALLERGIES    Review of patient's allergies indicates:  No Known Allergies    PROVIDER TRIAGE NOTE  This is a teletriage evaluation of a 88 y.o. male presenting to the ED with c/o increased fatigue - patient seen at [facility] and diagnosed with TIA this morning. Note from Epic shows transfer request for dizziness, weakness, and headache. Initial orders will be placed and care will be transferred to an alternate provider when patient is roomed for a full evaluation. Any additional orders and the final disposition will be determined by that provider.         ORDERS  Labs Reviewed   CBC W/ AUTO DIFFERENTIAL   COMPREHENSIVE METABOLIC PANEL   PROTIME-INR   TSH   LIPID PANEL   SARS-COV-2 RDRP GENE   POCT GLUCOSE, HAND-HELD DEVICE       ED Orders (720h ago, onward)    Start Ordered     Status Ordering Provider    12/03/20 1651 12/03/20 1651  Cardiac Monitoring - Adult  Continuous     Comments: Notify Physician If:    Ordered JODI RUBI P.    12/03/20 1651 12/03/20 1651  Pulse Oximetry Continuous  Continuous      Ordered JODI RUBI P.    12/03/20 1651 12/03/20 1651  Vital signs  Every 30 min     Comments: Every 30 minutes until admission orders completed or patient transferred. If patient receives tPA follow tPA monitoring flowsheet.    Ordered JODI RUBI P.    12/03/20 1651 12/03/20 1651  Nursing swallow assessment  Once      Ordered JODI RUBI P.    12/03/20 1651 12/03/20 1651   Diet NPO  Diet effective now     Comments: Diet NPO until after dysphagia screen completed    Ordered ELICIA, JODI P.    12/03/20 1651 12/03/20 1651  Insert peripheral IV  Once      Ordered RUBI, JODI P.    12/03/20 1651 12/03/20 1651  CBC W/ AUTO DIFFERENTIAL  STAT  Collect    Ordered RUBI, JODI P.    12/03/20 1651 12/03/20 1651  Comprehensive metabolic panel  STAT  Collect    Ordered RUBI, JODI BOTELLO.    12/03/20 1651 12/03/20 1651  Protime-INR  STAT  Collect    Ordered RUBI, JODI P.    12/03/20 1651 12/03/20 1651  TSH  STAT  Collect    Ordered RUBI, JODI P.    12/03/20 1651 12/03/20 1651  ECG 12 lead  Once      Ordered RUBI, JODI P.    12/03/20 1651 12/03/20 1651  POCT glucose  Once      Ordered RUBI, JODI BOTELLO.    12/03/20 1651 12/03/20 1651  LDL - Lipid Panel  STAT  Collect    Ordered RUBI, JOID MOORE    12/03/20 1650 12/03/20 1651  POCT COVID-19 Rapid Screening  Once      Ordered JODI RUBI            Virtual Visit Note: The provider triage portion of this emergency department evaluation and documentation was performed via [employer], a HIPAA-compliant telemedicine application, in concert with a tele-presenter in the room. A face to face patient evaluation with one of my colleagues will occur once the patient is placed in an emergency department room.      DISCLAIMER: This note was prepared with [employer] voice recognition transcription software. Garbled syntax, mangled pronouns, and other bizarre constructions may be attributed to that software system.

## 2020-12-04 LAB
ANION GAP SERPL CALC-SCNC: 6 MMOL/L (ref 8–16)
BUN SERPL-MCNC: 30 MG/DL (ref 8–23)
CALCIUM SERPL-MCNC: 9.4 MG/DL (ref 8.7–10.5)
CHLORIDE SERPL-SCNC: 109 MMOL/L (ref 95–110)
CHOLEST SERPL-MCNC: 134 MG/DL (ref 120–199)
CHOLEST/HDLC SERPL: 4.1 {RATIO} (ref 2–5)
CO2 SERPL-SCNC: 24 MMOL/L (ref 23–29)
CREAT SERPL-MCNC: 1.6 MG/DL (ref 0.5–1.4)
EST. GFR  (AFRICAN AMERICAN): 44 ML/MIN/1.73 M^2
EST. GFR  (NON AFRICAN AMERICAN): 38 ML/MIN/1.73 M^2
GLUCOSE SERPL-MCNC: 109 MG/DL (ref 70–110)
HDLC SERPL-MCNC: 33 MG/DL (ref 40–75)
HDLC SERPL: 24.6 % (ref 20–50)
HIV 1+2 AB+HIV1 P24 AG SERPL QL IA: NEGATIVE
LDLC SERPL CALC-MCNC: 76.4 MG/DL (ref 63–159)
NONHDLC SERPL-MCNC: 101 MG/DL
POTASSIUM SERPL-SCNC: 5.7 MMOL/L (ref 3.5–5.1)
SODIUM SERPL-SCNC: 139 MMOL/L (ref 136–145)
TRIGL SERPL-MCNC: 123 MG/DL (ref 30–150)
TROPONIN I SERPL DL<=0.01 NG/ML-MCNC: 0.04 NG/ML (ref 0–0.03)
TROPONIN I SERPL DL<=0.01 NG/ML-MCNC: 0.04 NG/ML (ref 0–0.03)

## 2020-12-04 PROCEDURE — 25500020 PHARM REV CODE 255: Performed by: INTERNAL MEDICINE

## 2020-12-04 PROCEDURE — 83520 IMMUNOASSAY QUANT NOS NONAB: CPT | Mod: 59

## 2020-12-04 PROCEDURE — 93010 EKG 12-LEAD: ICD-10-PCS | Mod: ,,, | Performed by: INTERNAL MEDICINE

## 2020-12-04 PROCEDURE — 84165 PATHOLOGIST INTERPRETATION SPE: ICD-10-PCS | Mod: 26,,, | Performed by: PATHOLOGY

## 2020-12-04 PROCEDURE — 84425 ASSAY OF VITAMIN B-1: CPT

## 2020-12-04 PROCEDURE — 80048 BASIC METABOLIC PNL TOTAL CA: CPT

## 2020-12-04 PROCEDURE — 94761 N-INVAS EAR/PLS OXIMETRY MLT: CPT

## 2020-12-04 PROCEDURE — 36415 COLL VENOUS BLD VENIPUNCTURE: CPT

## 2020-12-04 PROCEDURE — 84165 PROTEIN E-PHORESIS SERUM: CPT | Mod: 26,,, | Performed by: PATHOLOGY

## 2020-12-04 PROCEDURE — 86334 IMMUNOFIX E-PHORESIS SERUM: CPT

## 2020-12-04 PROCEDURE — 86592 SYPHILIS TEST NON-TREP QUAL: CPT

## 2020-12-04 PROCEDURE — 99203 OFFICE O/P NEW LOW 30 MIN: CPT | Mod: ,,, | Performed by: PSYCHIATRY & NEUROLOGY

## 2020-12-04 PROCEDURE — 96360 HYDRATION IV INFUSION INIT: CPT | Mod: 59

## 2020-12-04 PROCEDURE — 93010 ELECTROCARDIOGRAM REPORT: CPT | Mod: ,,, | Performed by: INTERNAL MEDICINE

## 2020-12-04 PROCEDURE — 99203 PR OFFICE/OUTPT VISIT, NEW, LEVL III, 30-44 MIN: ICD-10-PCS | Mod: ,,, | Performed by: PSYCHIATRY & NEUROLOGY

## 2020-12-04 PROCEDURE — 84484 ASSAY OF TROPONIN QUANT: CPT

## 2020-12-04 PROCEDURE — 25000003 PHARM REV CODE 250: Performed by: INTERNAL MEDICINE

## 2020-12-04 PROCEDURE — 84165 PROTEIN E-PHORESIS SERUM: CPT

## 2020-12-04 PROCEDURE — 86334 IMMUNOFIX E-PHORESIS SERUM: CPT | Mod: 26,,, | Performed by: PATHOLOGY

## 2020-12-04 PROCEDURE — 86703 HIV-1/HIV-2 1 RESULT ANTBDY: CPT

## 2020-12-04 PROCEDURE — 93005 ELECTROCARDIOGRAM TRACING: CPT

## 2020-12-04 PROCEDURE — G0378 HOSPITAL OBSERVATION PER HR: HCPCS

## 2020-12-04 PROCEDURE — 96361 HYDRATE IV INFUSION ADD-ON: CPT

## 2020-12-04 PROCEDURE — 86334 PATHOLOGIST INTERPRETATION IFE: ICD-10-PCS | Mod: 26,,, | Performed by: PATHOLOGY

## 2020-12-04 PROCEDURE — 82607 VITAMIN B-12: CPT

## 2020-12-04 PROCEDURE — 25000003 PHARM REV CODE 250: Performed by: EMERGENCY MEDICINE

## 2020-12-04 RX ORDER — SODIUM CHLORIDE 9 MG/ML
INJECTION, SOLUTION INTRAVENOUS CONTINUOUS
Status: DISCONTINUED | OUTPATIENT
Start: 2020-12-04 | End: 2020-12-05 | Stop reason: HOSPADM

## 2020-12-04 RX ORDER — AMLODIPINE BESYLATE 5 MG/1
5 TABLET ORAL DAILY
Status: DISCONTINUED | OUTPATIENT
Start: 2020-12-05 | End: 2020-12-05 | Stop reason: HOSPADM

## 2020-12-04 RX ADMIN — DIGOXIN 125 MCG: 125 TABLET ORAL at 09:12

## 2020-12-04 RX ADMIN — SODIUM CHLORIDE 100 ML/HR: 0.9 INJECTION, SOLUTION INTRAVENOUS at 02:12

## 2020-12-04 RX ADMIN — APIXABAN 2.5 MG: 2.5 TABLET, FILM COATED ORAL at 08:12

## 2020-12-04 RX ADMIN — ATORVASTATIN CALCIUM 40 MG: 20 TABLET, FILM COATED ORAL at 08:12

## 2020-12-04 RX ADMIN — AMLODIPINE BESYLATE 5 MG: 5 TABLET ORAL at 09:12

## 2020-12-04 RX ADMIN — FINASTERIDE 5 MG: 5 TABLET, FILM COATED ORAL at 09:12

## 2020-12-04 RX ADMIN — IOHEXOL 75 ML: 350 INJECTION, SOLUTION INTRAVENOUS at 01:12

## 2020-12-04 RX ADMIN — TAMSULOSIN HYDROCHLORIDE 0.4 MG: 0.4 CAPSULE ORAL at 09:12

## 2020-12-04 RX ADMIN — APIXABAN 2.5 MG: 2.5 TABLET, FILM COATED ORAL at 09:12

## 2020-12-04 NOTE — PLAN OF CARE
CM met with pt for initial discharge planning assessment. Pt is alert and oriented at time of assessment.     Pt provided his physical address, as he only has a PO Box in his face sheet. Physical address is:  [address] [place] [address]. Pt's PCP is Dr Trevino, entered into [employer]. Pharmacy of choice is [facility] in [place], [place], information is in Epic.    Pt states he is independent, drives, but his son will provide transportation home when discharged.    Dr Trevino would like pt to have hh when discharged. Pt likely discharging tomorrow. CM added a template for Dr Trevino, he will complete either later to day or in the morning.    CM sent template and referral to N thru [facility], in hopes they will work on arranging prior to Sat.    Pt currently with no HH and states he uses no DME. Pt is not on HD at this time.    CM to follow for plans and arrangements.   12/04/20 6595   Discharge Assessment   Assessment Type Discharge Planning Assessment   Confirmed/corrected address and phone number on facesheet? Yes   Assessment information obtained from? Patient;Medical Record   Expected Length of Stay (days) 2   Communicated expected length of stay with patient/caregiver yes   Prior to hospitilization cognitive status: Alert/Oriented   Prior to hospitalization functional status: Independent   Current cognitive status: Alert/Oriented   Current Functional Status: Independent   Lives With spouse   Able to Return to Prior Arrangements yes   Is patient able to care for self after discharge? Yes   Patient's perception of discharge disposition home health;home or selfcare   Readmission Within the Last 30 Days no previous admission in last 30 days   Patient currently being followed by outpatient case management? No   Patient currently receives any other outside agency services? No   Equipment Currently Used at Home none   Do you have any problems affording any of your prescribed medications? No   Is the patient taking  medications as prescribed? yes   Does the patient have transportation home? Yes   Transportation Anticipated family or friend will provide;car, drives self   Does the patient receive services at the [facility]? No   Discharge Plan A Home Health   Discharge Plan B Home Health   DME Needed Upon Discharge  other (see comments)   Patient/Family in Agreement with Plan yes

## 2020-12-04 NOTE — PLAN OF CARE
[facility]    HOME HEALTH ORDERS  FACE TO FACE ENCOUNTER    Patient Name: Dillon Dennis  YOB: 1932    PCP: Primary Doctor No   PCP Address: None  PCP Phone Number: None  PCP Fax: None    Encounter Date: 12/04/2020    Admit to Home Health    Diagnoses:  Active Hospital Problems    Diagnosis  POA    Dizziness [R42]  Yes      Resolved Hospital Problems   No resolved problems to display.       Future Appointments   Date Time Provider Department Center   12/4/2020  3:00 PM CARDIOLOGY, TESTS [facility] IP ECHO [facility], [facility]           I have seen and examined this patient face to face today. My clinical findings that support the need for the home health skilled services and home bound status are the following:  Weakness/numbness causing balance and gait disturbance due to Stroke making it taxing to leave home.    Allergies:Review of patient's allergies indicates:  No Known Allergies    Diet: cardiac diet    Activities: activity as tolerated    Nursing:   SN to complete comprehensive assessment including routine vital signs. Instruct on disease process and s/s of complications to report to MD. Review/verify medication list sent home with the patient at time of discharge  and instruct patient/caregiver as needed. Frequency may be adjusted depending on start of care date.    Notify MD if SBP > 160 or < 90; DBP > 90 or < 50; HR > 120 or < 50; Temp > 101; Other:         CONSULTS:    Physical Therapy to evaluate and treat. Evaluate for home safety and equipment needs; Establish/upgrade home exercise program. Perform / instruct on therapeutic exercises, gait training, transfer training, and Range of Motion.  Occupational Therapy to evaluate and treat. Evaluate home environment for safety and equipment needs. Perform/Instruct on transfers, ADL training, ROM, and therapeutic exercises.    MISCELLANEOUS CARE:  N/A    WOUND CARE ORDERS  n/a      Medications: Review discharge medications with  patient and family and provide education.      Current Discharge Medication List      CONTINUE these medications which have NOT CHANGED    Details   amLODIPine (NORVASC) 5 MG tablet TAKE ONE TABLET BY MOUTH AT LUNCH FOR BLOOD PRESSURE  Qty: 30 tablet, Refills: 5      atorvastatin (LIPITOR) 40 MG tablet TAKE ONE TABLET BY MOUTH EVERY NIGHT FOR CHOLESTEROL  Qty: 30 tablet, Refills: 6    Comments: This prescription was filled on 4/13/2020. Any refills authorized will be placed on file.      digoxin (LANOXIN) 125 mcg tablet Take 125 mcg by mouth once daily.      ELIQUIS 2.5 mg Tab TAKE ONE TABLET BY MOUTH TWICE DAILY  Qty: 60 tablet, Refills: 6    Comments: urgent  Associated Diagnoses: Paroxysmal atrial fibrillation; SSS (sick sinus syndrome)      finasteride (PROSCAR) 5 mg tablet TAKE ONE TABLET BY MOUTH AT LUNCH FOR prostate  Qty: 30 tablet, Refills: 6    Comments: This prescription was filled on 10/30/2020. Any refills authorized will be placed on file.      tamsulosin (FLOMAX) 0.4 mg Cap Take 0.4 mg by mouth once daily.             I certify that this patient is confined to his home and needs .

## 2020-12-04 NOTE — ED NOTES
Pt arrives to Ed with c/o abnormal gait. Pt reports being seen yesterday at another hospital and diagnosed with TIA. Pt reports feeling like he is walking on a boat. Pt denies headache, speaking in complete sentences, AAOx4, answering questions appropriately. Pt remains on BP cycling, pulse ox, and cardiac monitoring, family member at bedside updated on plan of care, will continue to monitor

## 2020-12-04 NOTE — PLAN OF CARE
Patient is awake, alert, and oriented x 4. Patient is ambulatory with stand-by assistance. Urinal at bedside. Patient denied pain during the night. Medications were administered per orders. VSS through the shift. SCDs in place. Neuro checks performed. Care clustered to promote rest. Patient remained free from injury and falls throughout the night. Bed locked in lowest position with side rails up x 2. Call light is within reach of patient. Purposeful rounding maintained throughout shift. Patient has no complaints at present. Will continue to monitor.

## 2020-12-04 NOTE — CONSULTS
NEUROLOGY CONSULT NOTE  [facility]    Patient Name:  Dillon Dennis  Date of Consult: 2020  Admit Date:    MR #:  [id number]  Acct #:  [id number]  :  1932    Physicians:  Primary Doctor Alycia (Family); Giovani Trevino MD  Consulting Physician:  Sujey Parikh MD       Chief Complaint/Reason for Consult: Dizziness, sensation of feeling off balance       Assessment and Plan:  Dillon Dennis is a 88 y.o. w/ a h/o hypertension, atrial fibrillation, s/p pacemaker, CKD presented to the emergency room for evaluation of episodes of wooziness- which he describes as a sensation of feeling unsteady when he walks.  The 1st such episode happened on 2020 about 2 hours after he had done some heavy weightlifting outside his home.  He had similar episodes yesterday which prompted him to go to the emergency room at an outside hospital.  He was told he may have had a small stroke but the patient left against medical advice to come here for further evaluation.    On neurological examination, the patient is alert and oriented x3, he has 5/5 strength throughout, absent ankle reflexes, absent vibration even up to his hip joints bilaterally.  He is slightly unsteady when he walks, he is able to walk on his heels and toes but has difficulty with tandem.  CT head reviewed and there is evidence of basilar artery calcification, symptoms concerning for orthostatic hypotension/ vertebrobasilar insufficiency.  He is already on Eliquis which he is compliant with.    Assessment:  1.  Episodes of dizziness - differential diagnosis as mentioned above  2.  Chronic kidney disease  3.  Atrial fibrillation  4.  Pacemaker in situ  5.  Idiopathic progressive polyneuropathy    Recommendations:  1.  Obtain CTA head and neck with contrast, IV hydration to avoid acute kidney injury  2.  Continue Eliquis 2.5 mg b.i.d.  3.  Continue Lipitor 40 mg nightly  4. Follow up with Neurology in one month   5.  Check  "hemoglobin A1c, RPR, TSH, HIV, vitamin B1, B12, B6, SPEP, VANITA, light chain ratio  6.  2D echo   7.PT/OT consult for dc planning        ess:  Dillon Dennis is a 88 y.o.  male on whom I have been asked to consult for evaluation and treatment of episodes of feeling "woozy". He has a h/o HTN, CKD, atrial fibrillation on Eliquis and a pacemaker     The patient states that he was working lifting heavy objects outside his home on Wednesday 12/03/2020, about 2 hours after doing this he had an episode of wooziness which he describes as a sensation of feeling off balance.  The room was not spinning when this happened.  He then proceeded to go to his bedroom and had another similar episode.  His son visited him and the patient stated he did not want to go to the emergency room and see how he would do overnight.  The next day when he woke up he had multiple such episodes, his wife also noted that he was walking off balance and not like he usually does.  This prompted him to go to an outside emergency room, he was told that he may have had a tiny stroke.       He is compliant with his Eliquis and Lipitor.  He missed taking the medications for a couple of days last week but not recently.    He has a pacemaker in place since the last several years which prevents him from getting an MRI as the leads are not MRI compatible    The patient states he had a stroke 13 years ago, he had severe vertigo, nausea and vomiting at that time.  He has been compliant with his Eliquis as he has atrial fibrillation.    He has a history of prior cerebral infarct (unknown location), hypertension, atrial fibrillation and has a pacemaker in place which was placed by Dr. Trevino      Duration:  Few minutes  Location:  Feeling woozy  Intensity:  Moderate  Aggravating factors:  Walking  Relieving factors:  Laying down  Frequency:  Several times  Timing:  During the day  Associated symptoms:  Feeling off balance when he walks        Laboratory and " Additional Data Reviewed:  was not present.    Outside reports reviewed: none.    Tests to date: All imaging reviewed personally by me in addition to cardiology reports.  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      70% diameter stenosis of the proximal right ICA.      Upper jugular occlusion which reconstitutes lower in the neck from external collaterals.      Dominant vertebral artery on the right with a small left vertebral artery.      Emphysema.         Electronically signed by: Jose Hawk MD   Date:    12/04/2020   Time:    13:56      CT Head Without Contrast   Final Result      No acute intracranial abnormalities identified.         Electronically signed by: Jonny Vitale MD   Date:    12/03/2020   Time:    19:29      MRA Neck with and without contrast    (Results Pending)   MRI Brain Without Contrast    (Results Pending)   MRA Brain with and without contrast    (Results Pending)       Labs: All labs reviewed by me.  Lab Results   Component Value Date    WBC 7.99 12/03/2020    HGB 11.6 (L) 12/03/2020    HCT 35.8 (L) 12/03/2020     12/03/2020    CHOL 134 12/03/2020    TRIG 123 12/03/2020    HDL 33 (L) 12/03/2020    LDLCALC 76.4 12/03/2020    ALT 18 12/03/2020    AST 28 12/03/2020     12/04/2020    K 5.7 (H) 12/04/2020     12/04/2020    CREATININE 1.6 (H) 12/04/2020    BUN 30 (H) 12/04/2020    TSH 1.591 12/03/2020         History:   No past medical history on file.  No past surgical history on file.  No family history on file.  Social History     Socioeconomic History    Marital status:      Spouse name: Not on file    Number of children: Not on file    Years of education: Not on file    Highest education level: Not on file   Occupational History    Not on file   Social Needs    Financial resource strain: Not on file    Food insecurity     Worry: Not on  file     Inability: Not on file    Transportation needs     Medical: Not on file     Non-medical: Not on file   Tobacco Use    Smoking status: Not on file   Substance and Sexual Activity    Alcohol use: Not on file    Drug use: Not on file    Sexual activity: Not on file   Lifestyle    Physical activity     Days per week: Not on file     Minutes per session: Not on file    Stress: Not on file   Relationships    Social connections     Talks on phone: Not on file     Gets together: Not on file     Attends Alevism service: Not on file     Active member of club or organization: Not on file     Attends meetings of clubs or organizations: Not on file     Relationship status: Not on file   Other Topics Concern    Not on file   Social History Narrative    Not on file       Allergy Information:        I have reviewed the patient's allergies.       Patient has no known allergies.    Home Medications:   No current facility-administered medications on file prior to encounter.      Current Outpatient Medications on File Prior to Encounter   Medication Sig Dispense Refill    amLODIPine (NORVASC) 5 MG tablet TAKE ONE TABLET BY MOUTH AT LUNCH FOR BLOOD PRESSURE 30 tablet 5    atorvastatin (LIPITOR) 40 MG tablet TAKE ONE TABLET BY MOUTH EVERY NIGHT FOR CHOLESTEROL 30 tablet 6    digoxin (LANOXIN) 125 mcg tablet Take 125 mcg by mouth once daily.      ELIQUIS 2.5 mg Tab TAKE ONE TABLET BY MOUTH TWICE DAILY 60 tablet 6    finasteride (PROSCAR) 5 mg tablet TAKE ONE TABLET BY MOUTH AT LUNCH FOR prostate 30 tablet 6    tamsulosin (FLOMAX) 0.4 mg Cap Take 0.4 mg by mouth once daily.         Hospital Medications Reviewed in EPIC   [START ON 12/5/2020] amLODIPine  5 mg Oral Daily    apixaban  2.5 mg Oral BID    atorvastatin  40 mg Oral QHS    digoxin  125 mcg Oral Daily    finasteride  5 mg Oral Daily    tamsulosin  0.4 mg Oral Daily       Review of Systems:    Review of Systems   Constitutional: Negative for chills  and fever.   HENT: Negative for hearing loss and tinnitus.    Eyes: Negative for blurred vision and double vision.   Respiratory: Negative for cough and hemoptysis.    Cardiovascular: Negative for chest pain and palpitations.   Gastrointestinal: Negative for heartburn and nausea.   Genitourinary: Negative for dysuria and urgency.   Musculoskeletal: Negative for myalgias and neck pain.   Skin: Negative for itching and rash.   Neurological: Positive for dizziness. Negative for speech change, focal weakness and headaches.   Endo/Heme/Allergies: Negative for environmental allergies. Does not bruise/bleed easily.   Psychiatric/Behavioral: Negative for depression and substance abuse.           Physical Examination:  Vital signs in last 24 hours:  Temp:  [96.5 °F (35.8 °C)-97.6 °F (36.4 °C)] 96.5 °F (35.8 °C)  Pulse:  [68-74] 68  Resp:  [16-20] 18  SpO2:  [98 %-100 %] 99 %  BP: (136-217)/(70-99) 136/70      GENERAL:  General appearance: Well, non-toxic appearing.  No apparent distress.  Neck: supple.    Peripheral pulses: normal.  Extremities: normal.    MENTAL STATUS:  Alertness, attention span & concentration: normal.  Language: normal.  Orientation to self, place & time:  normal.  Memory, recent & remote: normal.  Fund of knowledge: normal.      SPEECH:  Clear and fluent.  Follows complex commands.    CRANIAL NERVES:  Cranial Nerves II-XII were examined.  II - Visual fields: normal.  III, IV, VI: PERRL, EOMI, No ptosis, No nystagmus.  V - Facial sensation: normal.  VII - Face symmetry & mobility: normal.  VIII - Hearing: normal.  IX, X - Palate: mobile & midline.  XI - Shoulder shrug: normal.  XII - Tongue protrusion: normal.    GROSS MOTOR:  Gait & station:  Patient is slightly off balance when he walks, tends to keep feet turned outwards, able to walk on toes /heels with difficulty, unable to tandem  Tone: normal.  Abnormal movements: none.  Finger-nose & Heel-knee-shin: normal.  Rapid alternating movements & drift:  normal.    MUSCLE STRENGTH:     Fascics Atrophy RIGHT    LEFT Atrophy Fascics     5 Neck Ext. 5       5 Neck Flex 5       5 Deltoids 5       5 Sh.Ext.Rot. 5       5 Sh.Int.Rot. 5       5 Biceps 5       5 Triceps 5       5 Forearm.Pr. 5       5 Wrist Ext. 5       5 Wrist Flex 5       5 Finger Ext. 5       5 Finger Flex 5       5 FPL 5       5 Inteross. 5       5 FDI 5                5 Iliopsoas 5       5 Hip Abduct 5       5 Hip Adduct 5       5 Quads 5       5 Hams 5       5 Dorsiflex 5       5 Plantar Flex 5       5 Ankle Humberto 5       5 Ankle Invert 5       5 Toe Ext. 5       5 Toe Flex 5                         REFLEXES:    RIGHT Reflex   LEFT   2+ Biceps 2+   2+ Brachiorad. 2+   2+ Triceps 2+        2+ Patellar 2+   0 Ankle 0        Down PLANTAR Down     SENSORY:  Light touch: Normal throughout.  Sharp touch:  Gradient to the level of ankles bilaterally  Vibration:   Loss of vibration up to the level of the hip joints bilaterally  Normal throughout  Temperature: Normal throughout.  Joint Position: Intact to low amplitude changes at the great toes     40 minutes spent face-to-face, >50% spent advising about: counseling and/or coordination of care           Sujey Parikh M.D    Medicine-Neurology, Clinical Neurophysiology    This note was created with voice recognition software.  Grammatical, syntax and spelling errors may be inevitable.

## 2020-12-05 VITALS
HEART RATE: 55 BPM | SYSTOLIC BLOOD PRESSURE: 157 MMHG | TEMPERATURE: 98 F | RESPIRATION RATE: 15 BRPM | OXYGEN SATURATION: 96 % | WEIGHT: 136 LBS | DIASTOLIC BLOOD PRESSURE: 79 MMHG | BODY MASS INDEX: 19.47 KG/M2 | HEIGHT: 70 IN

## 2020-12-05 PROBLEM — I49.5 TACHYCARDIA-BRADYCARDIA SYNDROME: Status: ACTIVE | Noted: 2020-12-05

## 2020-12-05 PROBLEM — Z86.73 CEREBRAL ARTERIOSCLEROSIS WITH HISTORY OF PREVIOUS STROKE: Status: ACTIVE | Noted: 2020-12-05

## 2020-12-05 PROBLEM — I48.0 PAROXYSMAL ATRIAL FIBRILLATION: Status: ACTIVE | Noted: 2020-12-05

## 2020-12-05 PROBLEM — I10 ESSENTIAL HYPERTENSION: Status: ACTIVE | Noted: 2020-12-05

## 2020-12-05 PROBLEM — I67.2 CEREBRAL ARTERIOSCLEROSIS WITH HISTORY OF PREVIOUS STROKE: Status: ACTIVE | Noted: 2020-12-05

## 2020-12-05 PROBLEM — Z95.0 PACEMAKER: Status: ACTIVE | Noted: 2020-12-05

## 2020-12-05 LAB
ANION GAP SERPL CALC-SCNC: 6 MMOL/L (ref 8–16)
BUN SERPL-MCNC: 33 MG/DL (ref 8–23)
CALCIUM SERPL-MCNC: 8.3 MG/DL (ref 8.7–10.5)
CHLORIDE SERPL-SCNC: 114 MMOL/L (ref 95–110)
CO2 SERPL-SCNC: 20 MMOL/L (ref 23–29)
CREAT SERPL-MCNC: 1.6 MG/DL (ref 0.5–1.4)
EST. GFR  (AFRICAN AMERICAN): 44 ML/MIN/1.73 M^2
EST. GFR  (NON AFRICAN AMERICAN): 38 ML/MIN/1.73 M^2
GLUCOSE SERPL-MCNC: 101 MG/DL (ref 70–110)
POTASSIUM SERPL-SCNC: 4.6 MMOL/L (ref 3.5–5.1)
SODIUM SERPL-SCNC: 140 MMOL/L (ref 136–145)
VIT B12 SERPL-MCNC: 869 PG/ML (ref 210–950)

## 2020-12-05 PROCEDURE — 97165 OT EVAL LOW COMPLEX 30 MIN: CPT

## 2020-12-05 PROCEDURE — 96361 HYDRATE IV INFUSION ADD-ON: CPT

## 2020-12-05 PROCEDURE — 25000003 PHARM REV CODE 250: Performed by: INTERNAL MEDICINE

## 2020-12-05 PROCEDURE — 80048 BASIC METABOLIC PNL TOTAL CA: CPT

## 2020-12-05 PROCEDURE — 97116 GAIT TRAINING THERAPY: CPT

## 2020-12-05 PROCEDURE — 36415 COLL VENOUS BLD VENIPUNCTURE: CPT

## 2020-12-05 PROCEDURE — 25000003 PHARM REV CODE 250: Performed by: EMERGENCY MEDICINE

## 2020-12-05 PROCEDURE — G0378 HOSPITAL OBSERVATION PER HR: HCPCS

## 2020-12-05 PROCEDURE — 97161 PT EVAL LOW COMPLEX 20 MIN: CPT

## 2020-12-05 RX ADMIN — TAMSULOSIN HYDROCHLORIDE 0.4 MG: 0.4 CAPSULE ORAL at 08:12

## 2020-12-05 RX ADMIN — SODIUM CHLORIDE 100 ML/HR: 0.9 INJECTION, SOLUTION INTRAVENOUS at 01:12

## 2020-12-05 RX ADMIN — DIGOXIN 125 MCG: 125 TABLET ORAL at 08:12

## 2020-12-05 RX ADMIN — AMLODIPINE BESYLATE 5 MG: 5 TABLET ORAL at 08:12

## 2020-12-05 RX ADMIN — APIXABAN 2.5 MG: 2.5 TABLET, FILM COATED ORAL at 08:12

## 2020-12-05 RX ADMIN — FINASTERIDE 5 MG: 5 TABLET, FILM COATED ORAL at 08:12
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
## 2020-12-05 NOTE — PT/OT/SLP EVAL
"Physical Therapy Evaluation    Patient Name:  Dillon Dennis   MRN:  [id number]    Recommendations:     Discharge Recommendations:  other (see comments)(HH PT vs OP vestibular PT)   Discharge Equipment Recommendations: cane, straight   Barriers to discharge: Decreased caregiver support    Assessment:     Dillon Dennis is a 88 y.o. male admitted with a medical diagnosis of <principal problem not specified>.  He presents with the following impairments/functional limitations:  impaired functional mobilty, gait instability, impaired balance, decreased safety awareness. These impairments inhibit the patient from independently and safely participating in desired functional tasks such as transfers, ambulating within home/community, and taking care of wife (patient is wife's primary caregiver).    Evaluation complete; goals established. Patient with mild balance deficits while ambulating in open environment; thus, putting patient at increased risk of fall when not using cane. Patient would benefit from continued therapy to address balance concerns. Recommending HH PT vs OP PT with vestibular focus.    Rehab Prognosis: Good; patient would benefit from acute skilled PT services to address these deficits and reach maximum level of function.    Recent Surgery: * No surgery found *      Plan:     During this hospitalization, patient to be seen 5 x/week to address the identified rehab impairments via therapeutic activities, therapeutic exercises, gait training, neuromuscular re-education and progress toward the following goals:    · Plan of Care Expires:  01/04/21    Subjective     Chief Complaint: Patient inquisitive about cause of dizziness. When PT explained potential causes patient stated "no I don't think so."  Patient/Family Comments/goals: Patient most concerned about why he gets dizzy now.   Pain/Comfort:  · Pain Rating 1: 0/10  · Pain Rating Post-Intervention 1: 0/10    Patients cultural, spiritual, Methodist conflicts " given the current situation: no    Living Environment:  · Patient lives in two story home with wife   · Patient is wife's primary caregiver  · Patient's house has steps and an elevator. Patient prefers to use elevator  · Patient is still very active and drives  · Patient is retired but takes care of his wife  · Previous worked as   · Independent with all ADLs and IADLs prior    Objective:     Communicated with RN prior to session.  Patient found supine with peripheral IV, telemetry, SCD, bed alarm(SCD not on patient but in bed, AVASYS)  upon PT entry to room.    General Precautions: Standard, fall   Orthopedic Precautions:N/A   Braces: N/A     Exams:  Cognition:   Patient is oriented to name, , location and situation.  Pt follows approximately 100% of multi-step commands.    Mood: Pleasant and cooperative.   Musculoskeletal:  Posture:    -       Rounded shoulders  -       Forward head  LE ROM/Strength: WFL    Left  Muscle group Right   5/5 Hip flexion 5/5   5/5 Knee extension 5/5   5/5 Ankle dorsiflexion 5/5     Neuromuscular:  Sensation: Intact to light touch bilateral LEs.   Coordination: Not formally assessed but no coordination deficits noted with functional mobility  Tone/Reflexes: No impairments identified with functional mobility. No formal testing performed.   Balance: Poor dynamic standing: specifically tandem stance. Good static standing  Integument: Visible skin intact  Cardiopulmonary:  Edema: (-)  Vital signs supine:   BP: 181/81  Heart rate 70 bpm  Vital signs sitting EOB:   BP: 173/84  Heart rate 70 bpm  Vital signs standing:   BP: 155/76  Heart rate 72 bpm    4-Item Dynamic Gait Index:    1. Gait level surface (3) Normal: Walks 20ft, no assistive devices, good speed, no evidence for imbalance, normal gait pattern  2. Change in gait speed: (2) Mild Impairment: Is able to change speed but demonstrates mild gait deviations, or not gaitdeviations but unable to achieve a significant  change in velocity, or uses an assistive device.  3. Gait with horizontal head turns:(2) Mild Impairment: Performs head turns smoothly with slight change in gait velocity, i.e., minordisruption to smooth gait path or uses walking aid.  4. Gait with vertical head turns (2) Mild Impairment: Performs head turns smoothly with slight change in gait velocity, i.e., minordisruption to smooth gait path or uses walking aid.    Total: 9/12  Further balance evaluation warranted given score    Functional Mobility:  · Bed Mobility:   No use of HB features   · Supine to Sit: stand by assistance  · Sit to Supine: stand by assistance  · Transfers:     · Sit <> Stand:  stand by assistance with no AD  · Gait:   · ambulated ~ 500' in total, CGA/SBA with and without SPC  · Assessed ambulation in open environment without SPC, ~ 200'  · Mild gait deviations such as lateral sway and deviation of path  · PT demonstrated use of SPC while patient sat  · PT verbalized pattern and demonstrated at same time  · Patient required occasional verbal cues and intermittent rest breaks to understand pattern between steps and use of cane  · Ambulated ~ 300' in total with CGA/SBA while using cane  · Min A required when patient attempted tandem walk; step strategy used to prevent LOB  · Balance:   · Static sitting and standing: SBA  · Dynamic standing: tandem -- Min/Mod A without SPC    Therapeutic Activities and Exercises:  · Discussed importance of ankle pumps  · Encouraged 20x per commercial   · Discussed importance of using SPC while at home  · Discussed d/c recommendations     AM-PAC 6 CLICK MOBILITY  Total Score:21     Patient left supine with all lines intact, call button in reach and bed alarm on.    GOALS:   Multidisciplinary Problems     Physical Therapy Goals        Problem: Physical Therapy Goal    Goal Priority Disciplines Outcome Goal Variances Interventions   Physical Therapy Goal     PT, PT/OT Ongoing, Progressing     Description: Pt goal  to be met by 1/4/2020:    1. Patient with ambulate with SPC > 150' with SBA in open environment without deviation of path while talking with therapist during two consecutive sessions  2. Patient will negotiate 10 steps with step-through pattern with SHR requiring SBA and no verbal cues for safety  3. Patient will maintain tandem standing balance > 20 seconds with eyes open without LOB with SBA                   History:     No past medical history on file.    No past surgical history on file.    Time Tracking:     PT Received On: 12/05/20  PT Start Time: 0752     PT Stop Time: 0831  PT Total Time (min): 39 min     Billable Minutes: Evaluation 25 and Gait Training 14      Emily Leung, PT, DPT  12/05/2020

## 2020-12-05 NOTE — NURSING
Discharge instructions reviewed with patient and instructed Case Management would contact him on Monday 12/06/20 to discuss home health plans patient verbalized understanding. Copy of discharge instructions given to patient.

## 2020-12-05 NOTE — PLAN OF CARE
Received call from [facility], patient has been assigned to [facility]. Auth#[id number].  Agency will call patient on Monday.

## 2020-12-05 NOTE — DISCHARGE INSTRUCTIONS
Discharge instruction reviewed with patient who verbalized understanding. Copy of discharge instructions given to patient.

## 2020-12-05 NOTE — PLAN OF CARE
Problem: Physical Therapy Goal  Goal: Physical Therapy Goal  Description: Pt goal to be met by 1/4/2020:    1. Patient with ambulate with SPC > 150' with SBA in open environment without deviation of path while talking with therapist during two consecutive sessions  2. Patient will negotiate 10 steps with step-through pattern with SHR requiring SBA and no verbal cues for safety  3. Patient will maintain tandem standing balance > 20 seconds with eyes open without LOB with SBA  Outcome: Ongoing, Progressing     Evaluation complete; goals established. Patient with mild balance deficits while ambulating in open environment; thus, putting patient at increased risk of fall when not using cane. Patient would benefit from continued therapy to address balance concerns. Recommending HH PT vs OP PT with vestibular focus.   Emily Leung, PT, DPT  12/5/2020

## 2020-12-05 NOTE — PLAN OF CARE
Patient stable no distress noted. Patient discharged to home via wheelchair by transporter with personal belongings.

## 2020-12-05 NOTE — H&P
[facility]  History & Physical    History of Present Illness: Mr Dennis is an 88-year-old gentleman had the sudden onset of weakness and lightheadedness, feeling off balance as if he was walking on a boat.  In the morning upon awakening he continued to have unsteadiness and lightheadedness, called his son who brought him to the [facility], where he was told he had a minor stroke.  They was sent here for further evaluation and treatment.  Upon arrival here he says he feels a little better, less lightheaded but still not his usual state self.  He has a past history of having had longstanding hypertension, has had a previous stroke.  He has had atrial fibrillation, sick sinus syndrome, status post pacemaker implantation.  He has had benign prostatic hypertrophy.    PTA Medications   Medication Sig    amLODIPine (NORVASC) 5 MG tablet TAKE ONE TABLET BY MOUTH AT LUNCH FOR BLOOD PRESSURE    atorvastatin (LIPITOR) 40 MG tablet TAKE ONE TABLET BY MOUTH EVERY NIGHT FOR CHOLESTEROL    digoxin (LANOXIN) 125 mcg tablet Take 125 mcg by mouth once daily.    ELIQUIS 2.5 mg Tab TAKE ONE TABLET BY MOUTH TWICE DAILY    finasteride (PROSCAR) 5 mg tablet TAKE ONE TABLET BY MOUTH AT LUNCH FOR prostate    tamsulosin (FLOMAX) 0.4 mg Cap Take 0.4 mg by mouth once daily.       Review of patient's allergies indicates:  No Known Allergies    No past medical history on file.  No past surgical history on file.  No family history on file.  Social History     Tobacco Use    Smoking status: Not on file   Substance Use Topics    Alcohol use: Not on file    Drug use: Not on file        Physical Exam:  The carotid upstroke is brisk.  There is no carotid bruit.  The chest is clear.  The heart size is normal.  S1 and S2 are normal.  There is no audible murmur or gallop.  The abdomen is soft and nontender.  The bowel sounds are normal.  Extremities there is no clubbing cyanosis edema.  Neurological  exam the cranial nerves sensory system motor system and reflexes are all within normal limits.    Impression on Admission:  Possible transient ischemic attack  Previous stroke  Hypertension  Paroxysmal atrial fibrillation  Tachycardia bradycardia syndrome  Previous pacemaker implantation

## 2020-12-05 NOTE — PLAN OF CARE
12/05/20 1330   Post-Acute Status   Post-Acute Authorization Home Health   Home Health Status Set-up Complete

## 2020-12-05 NOTE — PLAN OF CARE
OT evaluation completed with no skilled OT treatment needs identified.  There are no discharge recommendations or DME needs.  D/C OT.  Vickie Zarco, Carmella, LOTR 12/5/2020

## 2020-12-05 NOTE — PLAN OF CARE
Patient is awake, alert, and oriented x 4. Patient is ambulatory and up to bathroom with stand-by assist. Urinal within reach of patient. Patient denied pain during the night. Medications were administered per orders. VSS through the shift. SCDs in place. Care clustered to promote rest. Avysis present at bedside. Patient remained free from injury and falls throughout the night. Bed locked in lowest position with side rails up x 2. Call light is within reach of patient. Purposeful rounding maintained throughout shift. Patient has no complaints at present. Will continue to monitor.

## 2020-12-05 NOTE — DISCHARGE SUMMARY
[facility]  Cardiology  Discharge Summary      Patient Name: Dillon Dennis    Admission Date: 12/3/2020    Discharge Date and Time:12/5/20  Final Diagnoses: TIA   Principal Problem:Dizziness, lightheadedness    HPI:  88 year gentleman with the onset of weakness lightheadedness and dizziness and feeling unsteady on his feet.  Previous history of stroke, history of atrial fibrillation, tachycardia bradycardia syndrome, status post pacemaker implantation.  Hypertension.  Has been on aspirin and Eliquis and atorvastatin daily.  Impression on Admission:  Possible transient ischemic attack.  Hospital Course:  After admission to the hospital, he says he felt well, his balance had returned to baseline, and had no longer any dizziness or lightheadedness.  He tolerated ambulation well.  He was seen in consultation by Dr. Parikh, his CT scan was reviewed and a CT angiogram was performed.  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  There was a 70% stenosis of the proximal right ICA.  An MRI was not done due to the presence of a pacemaker.  He was discharged to continue his aspirin and Eliquis and atorvastatin.  He will continue the amlodipine.   He will get an outpatient echocardiogram in the office, together with a carotid Doppler study.  Will consider having Dr. Vidal review his CTA to see if the arm right ICA warrants endarterectomy.    Disposition:  Discharged to his home with home health nursing.  Follow up/Patient Instructions:    Medications:   Dillon Dennis   Home Medication Instructions KEERTHI:[id number]    Printed on:12/05/20 1011   Medication Information                      amLODIPine (NORVASC) 5 MG tablet  TAKE ONE TABLET BY MOUTH AT LUNCH FOR BLOOD PRESSURE             atorvastatin (LIPITOR) 40 MG tablet  TAKE ONE TABLET BY MOUTH EVERY NIGHT FOR CHOLESTEROL              digoxin (LANOXIN) 125 mcg tablet  Take 125 mcg by mouth once daily.             ELIQUIS 2.5 mg Tab  TAKE ONE TABLET BY MOUTH TWICE DAILY             finasteride (PROSCAR) 5 mg tablet  TAKE ONE TABLET BY MOUTH AT LUNCH FOR prostate             tamsulosin (FLOMAX) 0.4 mg Cap  Take 0.4 mg by mouth once daily.               No discharge procedures on file.      Condition upon Discharge: stable        Giovani Trevino MD

## 2020-12-05 NOTE — PLAN OF CARE
Patient is discharged home with home heallt  Awaiting PHN for agency and authorization.  Agency will call patient once approved.  Will continue to f/u.  Patients family will transport home.  No further DC needs from  perspective.     12/05/20 1245   Final Note   Assessment Type Final Discharge Note   Anticipated Discharge Disposition Home-Health   Discharge plans and expectations educations in teach back method with documentation complete? Yes   Right Care Referral Info   Post Acute Recommendation Home-care   Referral Type Home Health   Facility Name [facility]   Post-Acute Status   Post-Acute Authorization Home Health   Home Health Status   (awaiting PHN for assigned agency)   Discharge Delays None known at this time

## 2020-12-07 LAB
ALBUMIN SERPL ELPH-MCNC: 4.15 G/DL (ref 3.35–5.55)
ALPHA1 GLOB SERPL ELPH-MCNC: 0.28 G/DL (ref 0.17–0.41)
ALPHA2 GLOB SERPL ELPH-MCNC: 0.99 G/DL (ref 0.43–0.99)
B-GLOBULIN SERPL ELPH-MCNC: 0.87 G/DL (ref 0.5–1.1)
GAMMA GLOB SERPL ELPH-MCNC: 1.42 G/DL (ref 0.67–1.58)
INTERPRETATION SERPL IFE-IMP: NORMAL
KAPPA LC SER QL IA: 5.96 MG/DL (ref 0.33–1.94)
KAPPA LC/LAMBDA SER IA: 1.83 (ref 0.26–1.65)
LAMBDA LC SER QL IA: 3.25 MG/DL (ref 0.57–2.63)
PROT SERPL-MCNC: 7.7 G/DL (ref 6–8.4)

## 2020-12-08 LAB
PATHOLOGIST INTERPRETATION IFE: NORMAL
PATHOLOGIST INTERPRETATION SPE: NORMAL
VIT B1 BLD-MCNC: 75 UG/L (ref 38–122)

## 2020-12-10 LAB
ASCENDING AORTA: 3.28 CM
AV INDEX (PROSTH): 0.82
AV MEAN GRADIENT: 4 MMHG
AV PEAK GRADIENT: 7 MMHG
AV VALVE AREA: 3.12 CM2
AV VELOCITY RATIO: 0.89
BSA FOR ECHO PROCEDURE: 1.75 M2
CV ECHO LV RWT: 0.49 CM
DOP CALC AO PEAK VEL: 1.29 M/S
DOP CALC AO VTI: 21.91 CM
DOP CALC LVOT AREA: 3.8 CM2
DOP CALC LVOT DIAMETER: 2.2 CM
DOP CALC LVOT PEAK VEL: 1.15 M/S
DOP CALC LVOT STROKE VOLUME: 68.35 CM3
DOP CALCLVOT PEAK VEL VTI: 17.99 CM
E WAVE DECELERATION TIME: 251.28 MSEC
E/E' RATIO: 7.53 M/S
ECHO LV POSTERIOR WALL: 1.08 CM (ref 0.6–1.1)
FRACTIONAL SHORTENING: 35 % (ref 28–44)
INTERVENTRICULAR SEPTUM: 1.09 CM (ref 0.6–1.1)
IVRT: 125.69 MSEC
LA MAJOR: 5.24 CM
LA MINOR: 4.74 CM
LA WIDTH: 3.85 CM
LEFT ATRIUM SIZE: 3.48 CM
LEFT ATRIUM VOLUME INDEX MOD: 35 ML/M2
LEFT ATRIUM VOLUME INDEX: 32 ML/M2
LEFT ATRIUM VOLUME MOD: 62 CM3
LEFT ATRIUM VOLUME: 56.68 CM3
LEFT INTERNAL DIMENSION IN SYSTOLE: 2.84 CM (ref 2.1–4)
LEFT VENTRICLE DIASTOLIC VOLUME INDEX: 48.77 ML/M2
LEFT VENTRICLE DIASTOLIC VOLUME: 86.4 ML
LEFT VENTRICLE MASS INDEX: 93 G/M2
LEFT VENTRICLE SYSTOLIC VOLUME INDEX: 17.3 ML/M2
LEFT VENTRICLE SYSTOLIC VOLUME: 30.68 ML
LEFT VENTRICULAR INTERNAL DIMENSION IN DIASTOLE: 4.37 CM (ref 3.5–6)
LEFT VENTRICULAR MASS: 163.9 G
LV LATERAL E/E' RATIO: 6.4 M/S
LV SEPTAL E/E' RATIO: 9.14 M/S
MV PEAK A VEL: 0 M/S
MV PEAK E VEL: 0.64 M/S
MV STENOSIS PRESSURE HALF TIME: 72.87 MS
MV VALVE AREA P 1/2 METHOD: 3.02 CM2
PISA MRMAX VEL: 0.02 M/S
PISA TR MAX VEL: 2 M/S
PV PEAK VELOCITY: 0.75 CM/S
RA MAJOR: 5.07 CM
RA PRESSURE: 3 MMHG
RA WIDTH: 4.79 CM
RIGHT VENTRICULAR END-DIASTOLIC DIMENSION: 3.04 CM
RV TISSUE DOPPLER FREE WALL SYSTOLIC VELOCITY 1 (APICAL 4 CHAMBER VIEW): 14.28 CM/S
SINUS: 3.37 CM
STJ: 3.27 CM
TDI LATERAL: 0.1 M/S
TDI SEPTAL: 0.07 M/S
TDI: 0.09 M/S
TR MAX PG: 16 MMHG
TRICUSPID ANNULAR PLANE SYSTOLIC EXCURSION: 2.61 CM
TV REST PULMONARY ARTERY PRESSURE: 19 MMHG

## 2021-01-12 ENCOUNTER — TELEPHONE (OUTPATIENT)
Dept: NEUROLOGY | Facility: CLINIC | Age: 86
End: 2021-01-12

## 2021-04-29 ENCOUNTER — PATIENT MESSAGE (OUTPATIENT)
Dept: NEUROLOGY | Facility: CLINIC | Age: 86
End: 2021-04-29

## 2021-06-11 ENCOUNTER — LAB VISIT (OUTPATIENT)
Dept: LAB | Facility: HOSPITAL | Age: 86
End: 2021-06-11
Attending: NURSE PRACTITIONER
Payer: MEDICARE

## 2021-06-11 DIAGNOSIS — I10 HTN (HYPERTENSION): Primary | ICD-10-CM

## 2021-06-11 LAB
ALBUMIN SERPL BCP-MCNC: 3.7 G/DL (ref 3.5–5.2)
ALP SERPL-CCNC: 82 U/L (ref 55–135)
ALT SERPL W/O P-5'-P-CCNC: 46 U/L (ref 10–44)
ANION GAP SERPL CALC-SCNC: 5 MMOL/L (ref 8–16)
AST SERPL-CCNC: 40 U/L (ref 10–40)
BILIRUB SERPL-MCNC: 1 MG/DL (ref 0.1–1)
BUN SERPL-MCNC: 41 MG/DL (ref 8–23)
CALCIUM SERPL-MCNC: 8.7 MG/DL (ref 8.7–10.5)
CHLORIDE SERPL-SCNC: 111 MMOL/L (ref 95–110)
CO2 SERPL-SCNC: 24 MMOL/L (ref 23–29)
CREAT SERPL-MCNC: 1.8 MG/DL (ref 0.5–1.4)
EST. GFR  (AFRICAN AMERICAN): 38 ML/MIN/1.73 M^2
EST. GFR  (NON AFRICAN AMERICAN): 33 ML/MIN/1.73 M^2
GLUCOSE SERPL-MCNC: 106 MG/DL (ref 70–110)
POTASSIUM SERPL-SCNC: 4.6 MMOL/L (ref 3.5–5.1)
PROT SERPL-MCNC: 7.2 G/DL (ref 6–8.4)
SODIUM SERPL-SCNC: 140 MMOL/L (ref 136–145)

## 2021-06-11 PROCEDURE — 80053 COMPREHEN METABOLIC PANEL: CPT | Performed by: NURSE PRACTITIONER

## 2021-06-11 PROCEDURE — 36415 COLL VENOUS BLD VENIPUNCTURE: CPT | Performed by: NURSE PRACTITIONER

## 2022-01-01 ENCOUNTER — TELEPHONE (OUTPATIENT)
Dept: DERMATOLOGY | Facility: CLINIC | Age: 87
End: 2022-01-01
Payer: MEDICARE

## 2022-01-01 ENCOUNTER — HOSPITAL ENCOUNTER (OUTPATIENT)
Facility: OTHER | Age: 87
Discharge: HOME | End: 2022-11-02
Attending: EMERGENCY MEDICINE | Admitting: INTERNAL MEDICINE
Payer: MEDICARE

## 2022-01-01 VITALS
HEART RATE: 69 BPM | WEIGHT: 136 LBS | DIASTOLIC BLOOD PRESSURE: 69 MMHG | BODY MASS INDEX: 19.47 KG/M2 | SYSTOLIC BLOOD PRESSURE: 144 MMHG | OXYGEN SATURATION: 97 % | RESPIRATION RATE: 18 BRPM | TEMPERATURE: 98 F | HEIGHT: 70 IN

## 2022-01-01 DIAGNOSIS — I25.10 CAD (CORONARY ARTERY DISEASE): ICD-10-CM

## 2022-01-01 DIAGNOSIS — R06.02 SOB (SHORTNESS OF BREATH): ICD-10-CM

## 2022-01-01 DIAGNOSIS — N28.9 ACUTE ON CHRONIC RENAL INSUFFICIENCY: ICD-10-CM

## 2022-01-01 DIAGNOSIS — N18.9 ACUTE ON CHRONIC RENAL INSUFFICIENCY: ICD-10-CM

## 2022-01-01 DIAGNOSIS — R60.0 PERIPHERAL EDEMA: Primary | ICD-10-CM

## 2022-01-01 LAB
ALBUMIN SERPL BCP-MCNC: 3.7 G/DL (ref 3.5–5.2)
ALP SERPL-CCNC: 76 U/L (ref 55–135)
ALT SERPL W/O P-5'-P-CCNC: 110 U/L (ref 10–44)
ANION GAP SERPL CALC-SCNC: 10 MMOL/L (ref 8–16)
ASCENDING AORTA: 2.79 CM
AST SERPL-CCNC: 54 U/L (ref 10–40)
AV INDEX (PROSTH): 0.71
AV MEAN GRADIENT: 6 MMHG
AV PEAK GRADIENT: 9 MMHG
AV VALVE AREA: 2.59 CM2
AV VELOCITY RATIO: 0.66
BASOPHILS # BLD AUTO: 0.01 K/UL (ref 0–0.2)
BASOPHILS NFR BLD: 0.1 % (ref 0–1.9)
BILIRUB SERPL-MCNC: 1.4 MG/DL (ref 0.1–1)
BNP SERPL-MCNC: 282 PG/ML (ref 0–99)
BSA FOR ECHO PROCEDURE: 1.75 M2
BUN SERPL-MCNC: 56 MG/DL (ref 10–30)
CALCIUM SERPL-MCNC: 9.4 MG/DL (ref 8.7–10.5)
CHLORIDE SERPL-SCNC: 111 MMOL/L (ref 95–110)
CO2 SERPL-SCNC: 20 MMOL/L (ref 23–29)
CREAT SERPL-MCNC: 2.1 MG/DL (ref 0.5–1.4)
CV ECHO LV RWT: 0.37 CM
DIFFERENTIAL METHOD: ABNORMAL
DOP CALC AO PEAK VEL: 1.54 M/S
DOP CALC AO VTI: 28.6 CM
DOP CALC LVOT AREA: 3.7 CM2
DOP CALC LVOT DIAMETER: 2.16 CM
DOP CALC LVOT PEAK VEL: 1.02 M/S
DOP CALC LVOT STROKE VOLUME: 73.98 CM3
DOP CALCLVOT PEAK VEL VTI: 20.2 CM
E WAVE DECELERATION TIME: 143.49 MSEC
E/A RATIO: 3.76
E/E' RATIO: 31.14 M/S
ECHO LV POSTERIOR WALL: 0.89 CM (ref 0.6–1.1)
EJECTION FRACTION: 73 %
EOSINOPHIL # BLD AUTO: 0 K/UL (ref 0–0.5)
EOSINOPHIL NFR BLD: 0.1 % (ref 0–8)
ERYTHROCYTE [DISTWIDTH] IN BLOOD BY AUTOMATED COUNT: 15.1 % (ref 11.5–14.5)
EST. GFR  (NO RACE VARIABLE): 29 ML/MIN/1.73 M^2
FRACTIONAL SHORTENING: 42 % (ref 28–44)
GLUCOSE SERPL-MCNC: 214 MG/DL (ref 70–110)
HCT VFR BLD AUTO: 33.6 % (ref 40–54)
HGB BLD-MCNC: 11.1 G/DL (ref 14–18)
IMM GRANULOCYTES # BLD AUTO: 0.13 K/UL (ref 0–0.04)
IMM GRANULOCYTES NFR BLD AUTO: 1 % (ref 0–0.5)
INTERVENTRICULAR SEPTUM: 0.92 CM (ref 0.6–1.1)
IVC DIAMETER: 1.74 CM
IVRT: 91.34 MSEC
LA MAJOR: 5.63 CM
LA MINOR: 6.69 CM
LA WIDTH: 4.3 CM
LEFT ATRIUM SIZE: 3.22 CM
LEFT ATRIUM VOLUME INDEX MOD: 61 ML/M2
LEFT ATRIUM VOLUME INDEX: 40.7 ML/M2
LEFT ATRIUM VOLUME MOD: 108 CM3
LEFT ATRIUM VOLUME: 71.96 CM3
LEFT INTERNAL DIMENSION IN SYSTOLE: 2.78 CM (ref 2.1–4)
LEFT VENTRICLE DIASTOLIC VOLUME INDEX: 59.77 ML/M2
LEFT VENTRICLE DIASTOLIC VOLUME: 105.8 ML
LEFT VENTRICLE MASS INDEX: 83 G/M2
LEFT VENTRICLE SYSTOLIC VOLUME INDEX: 16.4 ML/M2
LEFT VENTRICLE SYSTOLIC VOLUME: 29.01 ML
LEFT VENTRICULAR INTERNAL DIMENSION IN DIASTOLE: 4.77 CM (ref 3.5–6)
LEFT VENTRICULAR MASS: 147.33 G
LV LATERAL E/E' RATIO: 36.33 M/S
LV SEPTAL E/E' RATIO: 27.25 M/S
LVOT MG: 2.07 MMHG
LVOT MV: 0.68 CM/S
LYMPHOCYTES # BLD AUTO: 1.1 K/UL (ref 1–4.8)
LYMPHOCYTES NFR BLD: 8.5 % (ref 18–48)
MAGNESIUM SERPL-MCNC: 1.9 MG/DL (ref 1.6–2.6)
MCH RBC QN AUTO: 31.5 PG (ref 27–31)
MCHC RBC AUTO-ENTMCNC: 33 G/DL (ref 32–36)
MCV RBC AUTO: 96 FL (ref 82–98)
MONOCYTES # BLD AUTO: 0.7 K/UL (ref 0.3–1)
MONOCYTES NFR BLD: 5.2 % (ref 4–15)
MV PEAK A VEL: 0.29 M/S
MV PEAK E VEL: 1.09 M/S
MV STENOSIS PRESSURE HALF TIME: 41.61 MS
MV VALVE AREA P 1/2 METHOD: 5.29 CM2
NEUTROPHILS # BLD AUTO: 11.4 K/UL (ref 1.8–7.7)
NEUTROPHILS NFR BLD: 85.1 % (ref 38–73)
NRBC BLD-RTO: 0 /100 WBC
PISA MRMAX VEL: 5.07 M/S
PISA TR MAX VEL: 2.5 M/S
PLATELET # BLD AUTO: 167 K/UL (ref 150–450)
PMV BLD AUTO: 10.1 FL (ref 9.2–12.9)
POTASSIUM SERPL-SCNC: 4.5 MMOL/L (ref 3.5–5.1)
PROT SERPL-MCNC: 6.8 G/DL (ref 6–8.4)
PV PEAK S VEL: 0.73 M/S
PV PEAK VELOCITY: 0.86 CM/S
RA MAJOR: 5.72 CM
RA WIDTH: 2.4 CM
RBC # BLD AUTO: 3.52 M/UL (ref 4.6–6.2)
SODIUM SERPL-SCNC: 141 MMOL/L (ref 136–145)
STJ: 2.75 CM
TDI LATERAL: 0.03 M/S
TDI SEPTAL: 0.04 M/S
TDI: 0.04 M/S
TR MAX PG: 25 MMHG
TROPONIN I SERPL DL<=0.01 NG/ML-MCNC: 0.09 NG/ML (ref 0–0.03)
TSH SERPL DL<=0.005 MIU/L-ACNC: 0.4 UIU/ML (ref 0.4–4)
WBC # BLD AUTO: 13.37 K/UL (ref 3.9–12.7)

## 2022-01-01 PROCEDURE — G0378 HOSPITAL OBSERVATION PER HR: HCPCS

## 2022-01-01 PROCEDURE — 25000003 PHARM REV CODE 250: Performed by: INTERNAL MEDICINE

## 2022-01-01 PROCEDURE — 93010 EKG 12-LEAD: ICD-10-PCS | Mod: ,,, | Performed by: INTERNAL MEDICINE

## 2022-01-01 PROCEDURE — 93005 ELECTROCARDIOGRAM TRACING: CPT

## 2022-01-01 PROCEDURE — A4216 STERILE WATER/SALINE, 10 ML: HCPCS | Performed by: EMERGENCY MEDICINE

## 2022-01-01 PROCEDURE — 84484 ASSAY OF TROPONIN QUANT: CPT | Performed by: EMERGENCY MEDICINE

## 2022-01-01 PROCEDURE — 84443 ASSAY THYROID STIM HORMONE: CPT | Performed by: EMERGENCY MEDICINE

## 2022-01-01 PROCEDURE — 99285 EMERGENCY DEPT VISIT HI MDM: CPT | Mod: 25

## 2022-01-01 PROCEDURE — 85025 COMPLETE CBC W/AUTO DIFF WBC: CPT | Performed by: EMERGENCY MEDICINE

## 2022-01-01 PROCEDURE — 80053 COMPREHEN METABOLIC PANEL: CPT | Performed by: EMERGENCY MEDICINE

## 2022-01-01 PROCEDURE — 96374 THER/PROPH/DIAG INJ IV PUSH: CPT

## 2022-01-01 PROCEDURE — 25000003 PHARM REV CODE 250: Performed by: EMERGENCY MEDICINE

## 2022-01-01 PROCEDURE — 93010 ELECTROCARDIOGRAM REPORT: CPT | Mod: ,,, | Performed by: INTERNAL MEDICINE

## 2022-01-01 PROCEDURE — 83735 ASSAY OF MAGNESIUM: CPT | Performed by: EMERGENCY MEDICINE

## 2022-01-01 PROCEDURE — 63600175 PHARM REV CODE 636 W HCPCS: Performed by: EMERGENCY MEDICINE

## 2022-01-01 PROCEDURE — 83880 ASSAY OF NATRIURETIC PEPTIDE: CPT | Performed by: EMERGENCY MEDICINE

## 2022-01-01 RX ORDER — ASPIRIN 81 MG/1
81 TABLET ORAL DAILY
Status: DISCONTINUED | OUTPATIENT
Start: 2022-01-01 | End: 2022-01-01 | Stop reason: HOSPADM

## 2022-01-01 RX ORDER — TRAMADOL HYDROCHLORIDE 50 MG/1
50 TABLET ORAL EVERY 6 HOURS PRN
Status: DISCONTINUED | OUTPATIENT
Start: 2022-01-01 | End: 2022-01-01

## 2022-01-01 RX ORDER — LOSARTAN POTASSIUM 50 MG/1
50 TABLET ORAL DAILY
Status: DISCONTINUED | OUTPATIENT
Start: 2022-01-01 | End: 2022-01-01 | Stop reason: HOSPADM

## 2022-01-01 RX ORDER — TALC
6 POWDER (GRAM) TOPICAL NIGHTLY PRN
Status: DISCONTINUED | OUTPATIENT
Start: 2022-01-01 | End: 2022-01-01 | Stop reason: HOSPADM

## 2022-01-01 RX ORDER — SODIUM CHLORIDE 0.9 % (FLUSH) 0.9 %
10 SYRINGE (ML) INJECTION
Status: DISCONTINUED | OUTPATIENT
Start: 2022-01-01 | End: 2022-01-01 | Stop reason: HOSPADM

## 2022-01-01 RX ORDER — DILTIAZEM HYDROCHLORIDE 180 MG/1
180 CAPSULE, COATED, EXTENDED RELEASE ORAL DAILY
Status: DISCONTINUED | OUTPATIENT
Start: 2022-01-01 | End: 2022-01-01 | Stop reason: HOSPADM

## 2022-01-01 RX ORDER — MINOCYCLINE HYDROCHLORIDE 100 MG/1
100 CAPSULE ORAL EVERY 12 HOURS
Qty: 12 CAPSULE | Refills: 0 | Status: SHIPPED | OUTPATIENT
Start: 2022-01-01

## 2022-01-01 RX ORDER — FINASTERIDE 5 MG/1
5 TABLET, FILM COATED ORAL DAILY
Status: DISCONTINUED | OUTPATIENT
Start: 2022-01-01 | End: 2022-01-01 | Stop reason: HOSPADM

## 2022-01-01 RX ORDER — METOPROLOL SUCCINATE 25 MG/1
25 TABLET, EXTENDED RELEASE ORAL DAILY
Status: DISCONTINUED | OUTPATIENT
Start: 2022-01-01 | End: 2022-01-01 | Stop reason: HOSPADM

## 2022-01-01 RX ORDER — FUROSEMIDE 10 MG/ML
40 INJECTION INTRAMUSCULAR; INTRAVENOUS
Status: COMPLETED | OUTPATIENT
Start: 2022-01-01 | End: 2022-01-01

## 2022-01-01 RX ORDER — PANTOPRAZOLE SODIUM 40 MG/1
40 TABLET, DELAYED RELEASE ORAL DAILY
Status: DISCONTINUED | OUTPATIENT
Start: 2022-01-01 | End: 2022-01-01 | Stop reason: HOSPADM

## 2022-01-01 RX ORDER — ATORVASTATIN CALCIUM 20 MG/1
40 TABLET, FILM COATED ORAL DAILY
Status: DISCONTINUED | OUTPATIENT
Start: 2022-01-01 | End: 2022-01-01 | Stop reason: HOSPADM

## 2022-01-01 RX ORDER — TRAMADOL HYDROCHLORIDE 50 MG/1
50 TABLET ORAL EVERY 8 HOURS PRN
Status: DISCONTINUED | OUTPATIENT
Start: 2022-01-01 | End: 2022-01-01 | Stop reason: HOSPADM

## 2022-01-01 RX ORDER — DIGOXIN 125 MCG
125 TABLET ORAL DAILY
Status: DISCONTINUED | OUTPATIENT
Start: 2022-01-01 | End: 2022-01-01 | Stop reason: HOSPADM

## 2022-01-01 RX ORDER — MINOCYCLINE HYDROCHLORIDE 100 MG/1
100 CAPSULE ORAL EVERY 12 HOURS
Status: DISCONTINUED | OUTPATIENT
Start: 2022-01-01 | End: 2022-01-01 | Stop reason: HOSPADM

## 2022-01-01 RX ADMIN — APIXABAN 2.5 MG: 2.5 TABLET, FILM COATED ORAL at 12:11

## 2022-01-01 RX ADMIN — PANTOPRAZOLE SODIUM 40 MG: 40 TABLET, DELAYED RELEASE ORAL at 08:11

## 2022-01-01 RX ADMIN — ATORVASTATIN CALCIUM 40 MG: 20 TABLET, FILM COATED ORAL at 08:11

## 2022-01-01 RX ADMIN — Medication 10 ML: at 09:11

## 2022-01-01 RX ADMIN — ASPIRIN 81 MG: 81 TABLET, COATED ORAL at 08:11

## 2022-01-01 RX ADMIN — MINOCYCLINE HYDROCHLORIDE 100 MG: 100 CAPSULE ORAL at 10:11

## 2022-01-01 RX ADMIN — DIGOXIN 125 MCG: 125 TABLET ORAL at 09:11

## 2022-01-01 RX ADMIN — METOPROLOL SUCCINATE 25 MG: 25 TABLET, EXTENDED RELEASE ORAL at 09:11

## 2022-01-01 RX ADMIN — TRAMADOL HYDROCHLORIDE 50 MG: 50 TABLET ORAL at 10:11

## 2022-01-01 RX ADMIN — FINASTERIDE 5 MG: 5 TABLET, FILM COATED ORAL at 09:11

## 2022-01-01 RX ADMIN — MINOCYCLINE HYDROCHLORIDE 100 MG: 100 CAPSULE ORAL at 09:11

## 2022-01-01 RX ADMIN — FUROSEMIDE 40 MG: 10 INJECTION, SOLUTION INTRAMUSCULAR; INTRAVENOUS at 06:10

## 2022-01-01 RX ADMIN — DILTIAZEM HYDROCHLORIDE 180 MG: 180 CAPSULE, COATED, EXTENDED RELEASE ORAL at 09:11

## 2022-01-01 RX ADMIN — LOSARTAN POTASSIUM 50 MG: 50 TABLET, FILM COATED ORAL at 09:11

## 2022-01-01 RX ADMIN — APIXABAN 2.5 MG: 2.5 TABLET, FILM COATED ORAL at 08:11

## 2022-01-01 RX ADMIN — ATORVASTATIN CALCIUM 40 MG: 20 TABLET, FILM COATED ORAL at 09:11

## 2022-01-01 RX ADMIN — APIXABAN 2.5 MG: 2.5 TABLET, FILM COATED ORAL at 10:11

## 2022-01-01 RX ADMIN — DILTIAZEM HYDROCHLORIDE 180 MG: 180 CAPSULE, COATED, EXTENDED RELEASE ORAL at 08:11

## 2022-01-01 RX ADMIN — LOSARTAN POTASSIUM 50 MG: 50 TABLET, FILM COATED ORAL at 08:11

## 2022-01-01 RX ADMIN — DIGOXIN 125 MCG: 125 TABLET ORAL at 08:11

## 2022-10-21 NOTE — TELEPHONE ENCOUNTER
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

## 2022-10-25 NOTE — TELEPHONE ENCOUNTER
"----- Message from Stephaniethien Goodrich sent at 10/25/2022  4:21 PM CDT -----  Scheduling Request                   Patient Status:  active    Scheduling Appt : NP    Time/Date Preference: first available    Where's Up Active User?: yes    Relationship to Patient?: vita Menendez    Contact Preference?: [phone]     Treating Provider:  Joanne    Do you feel you need to be seen today? no                   Additional Notes:  "Thank you for all that you do for our patients'     "

## 2022-10-25 NOTE — TELEPHONE ENCOUNTER
LM for pt to call back to schedule Mohs, BCC postauricular area and SCC R lateral cheek ([facility] referral).

## 2022-10-31 NOTE — TELEPHONE ENCOUNTER
Pt's daughter Kaitlin called; left message that I spoke to pt's son Shon and have pt scheduled for 12/15 at 12:30pm for both sites. Left direct number for pt's daughter to call with questions.

## 2022-10-31 NOTE — TELEPHONE ENCOUNTER
NP scheduled for same-day Mohs for SCC R postauricular & R lateral cheek on 12/15 at 12:30pm. Pt's son confirmed date, time, and location. Over-the-phone consult completed. NP appt information sent in the mail.

## 2022-10-31 NOTE — ED TRIAGE NOTES
Pt reports to ED with CHF exacerbation. He was sent by his primary care doctor. He c/o shortness of breath, bilateral edema, and wounds that are weeping from his right foot. He denies any chest pain at this time. He is aaox4. In no acute distress.

## 2022-10-31 NOTE — TELEPHONE ENCOUNTER
"----- Message from Mp Moe sent at 10/31/2022 11:05 AM CDT -----  Consult/Advisory:          Name Of Caller:  Kaitlin (Daughter)      Contact Preference?:  [phone] (Mobile)         Provider Name: Joanne      Does patient feel the need to be seen today? No      What is the nature of the call?: Returning call to Merlyn Mendoza          Additional Notes:  "Thank you for all that you do for our patients"       "
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
## 2022-11-01 NOTE — CONSULTS
[facility] - Med Surg ([place])  Adult Nutrition  Consult Note    SUMMARY     Recommendations    Recommendation/Intervention:   1.  Recommend aravind BID to promote wound healing    2. Encourage po intake   3. Daily weights    4. Collaboration with medical providers    Goals: Patient to consume 50% of EEN prior to RD follow up.    Nutrition Goal Status: new    Communication of RD Recs: other (comment)    Assessment and Plan    Nutrition Problem  Increased nutrition needs     Related to (etiology):   Condition associated with diagnosis     Signs and Symptoms (as evidenced by):   Delayed wound healing   Decreased po intake     Interventions/Recommendations (treatment strategy):  1.  Recommend aravind BID to promote wound healing    2. Encourage po intake   3. Daily weights    4. Collaboration with medical providers    Nutrition Diagnosis Status:   New      Malnutrition Assessment                                       Reason for Assessment    Reason For Assessment: consult, identified at risk by screening criteria  Diagnosis: renal disease  Patient Active Problem List   Diagnosis    CVA (cerebral infarction)    Carotid artery stenosis    SSS (sick sinus syndrome)    BPH (benign prostatic hypertrophy)    Skin cancer    Atrial fibrillation, chronic    Pericarditis    CKD (chronic kidney disease) stage 4, GFR 15-29 ml/min    Left shoulder pain    CAD (coronary artery disease)    Gout of ankle    Known medical problems    Anemia associated with nutritional deficiency    Cerebral infarction    Vertebrobasilar insufficiency    Cardiac pacemaker in situ    Essential hypertension    Dizziness    Essential hypertension    Paroxysmal atrial fibrillation    Cerebral arteriosclerosis with history of previous stroke    Pacemaker    Tachycardia-bradycardia syndrome     Past Medical History:   Diagnosis Date    *Atrial fibrillation     AF (paroxysmal atrial fibrillation) 9/20/2012    BPH (benign prostatic hypertrophy) 9/20/2012    Cancer   "   skin    Carotid artery stenosis 9/20/2012    CKD (chronic kidney disease) stage 3, GFR 30-59 ml/min 5/1/2014    CVA (cerebral infarction) 9/20/2012    Hypertension     Known medical problems 6/5/2015    CCM 5/15    PONV (postoperative nausea and vomiting)     SSS (sick sinus syndrome) 9/20/2012    Stroke 2002       Interdisciplinary Rounds: did not attend (RD remote)    General Information Comments:   11/1: Patient with a low na diet at this time. Wound noted to the right heel.  RD consulted for wound healing.  RD recommends supplements to promote wound healing.  Patient with 2-3+ edema in lower extremities.  Weight loss history does not show significant weight loss within the previous year.  On site RD to follow up with NFPE and risks.  Labs reviewed.  NKFA.  LBM:10/29.    Nutrition Discharge Planning: Patient to consume adequate nutrition prior and post discharge.    Nutrition Risk Screen    Nutrition Risk Screen: large or nonhealing wound, burn or pressure injury    Nutrition/Diet History    Spiritual, Cultural Beliefs, Voodoo Practices, Values that Affect Care: no  Food Allergies: NKFA  Factors Affecting Nutritional Intake: decreased appetite    Anthropometrics    Temp: 96.9 °F (36.1 °C)  Height Method: Stated  Height: 5' 10" (177.8 cm)  Height (inches): 70 in  Weight Method: Bed Scale  Weight: 61.7 kg (136 lb 0.4 oz)  Weight (lb): 136.03 lb  Ideal Body Weight (IBW), Male: 166 lb  % Ideal Body Weight, Male (lb): 81.95 %  BMI (Calculated): 19.5  BMI Grade: 18.5-24.9 - normal       Lab/Procedures/Meds    Pertinent Labs Reviewed: reviewed  Pertinent Medications Reviewed: reviewed  BMP  Lab Results   Component Value Date     10/31/2022    K 4.5 10/31/2022     (H) 10/31/2022    CO2 20 (L) 10/31/2022    BUN 56 (H) 10/31/2022    CREATININE 2.1 (H) 10/31/2022    CALCIUM 9.4 10/31/2022    ANIONGAP 10 10/31/2022    ESTGFRAFRICA 38 (A) 06/11/2021    EGFRNONAA 33 (A) 06/11/2021     Lab Results   Component " Value Date    LABPROT 13.6 (H) 09/08/2015    ALBUMIN 3.7 10/31/2022     Lab Results   Component Value Date    LABA1C 5.8 (H) 05/29/2015     Lab Results   Component Value Date    CALCIUM 9.4 10/31/2022     Scheduled Meds:   apixaban  2.5 mg Oral BID    aspirin  81 mg Oral Daily    atorvastatin  40 mg Oral Daily    digoxin  125 mcg Oral Daily    diltiaZEM  180 mg Oral Daily    finasteride  5 mg Oral Daily    losartan  50 mg Oral Daily    metoprolol succinate  25 mg Oral Daily    pantoprazole  40 mg Oral Daily     Continuous Infusions:  PRN Meds:.influenza 65up-adj, melatonin, sodium chloride 0.9%    Physical Findings/Assessment         Estimated/Assessed Needs    Weight Used For Calorie Calculations: 61.7 kg (136 lb 0.4 oz)  Energy Calorie Requirements (kcal): 25-35kcals/kg (1542-2160kcals/day)  Energy Need Method: Kcal/kg  Protein Requirements: 0.8-1.0g/kg (hx of CKD)  Weight Used For Protein Calculations: 61.7 kg (136 lb 0.4 oz)     Estimated Fluid Requirement Method: RDA Method  RDA Method (mL): 25  CHO Requirement: 225      Nutrition Prescription Ordered    Current Diet Order: Low na    Evaluation of Received Nutrient/Fluid Intake    % Kcal Needs: <50%  % Protein Needs: <50%  Energy Calories Required: not meeting needs  Protein Required: not meeting needs  Fluid Required: not meeting needs  Tolerance: other (see comments)  % Intake of Estimated Energy Needs: 0 - 25 %  % Meal Intake: 0 - 25 %  Intake/Output - Last 3 Shifts         10/30 0700  10/31 0659 10/31 0700  11/01 0659 11/01 0700 11/02 0659    P.O.  150     Total Intake(mL/kg)  150 (2.4)     Urine (mL/kg/hr)  1800     Emesis/NG output  0     Other  0     Stool  0     Total Output  1800     Net  -1650            Urine Occurrence  3 x     Stool Occurrence  0 x     Emesis Occurrence  0 x             Nutrition Risk    Level of Risk/Frequency of Follow-up: moderate       Monitor and Evaluation    Food and Nutrient Intake: energy intake, food and beverage  intake  Food and Nutrient Adminstration: diet order  Knowledge/Beliefs/Attitudes: food and nutrition knowledge/skill, beliefs and attitudes  Physical Activity and Function: nutrition-related ADLs and IADLs, factors affecting access to physical activity  Anthropometric Measurements: height/length, weight, weight change, body mass index  Biochemical Data, Medical Tests and Procedures: inflammatory profile, glucose/endocrine profile, gastrointestinal profile, electrolyte and renal panel, lipid profile       Nutrition Follow-Up    RD Follow-up?: Yes  Ester Benavides, MS, RDN, LDN

## 2022-11-01 NOTE — CONSULTS
[facility] Med Surg ([place])  Wound Care    Patient Name:  Dillon Dennis   MRN:  [id number]  Date: 2022  Diagnosis: <principal problem not specified>    History:     Past Medical History:   Diagnosis Date    *Atrial fibrillation     AF (paroxysmal atrial fibrillation) 2012    BPH (benign prostatic hypertrophy) 2012    Cancer     skin    Carotid artery stenosis 2012    CKD (chronic kidney disease) stage 3, GFR 30-59 ml/min 2014    CVA (cerebral infarction) 2012    Hypertension     Known medical problems 2015    CCM 5/15    PONV (postoperative nausea and vomiting)     SSS (sick sinus syndrome) 2012    Stroke 2002       Social History     Socioeconomic History    Marital status:     Number of children: y   Occupational History    Occupation: [employer]     Comment: retired   Tobacco Use    Smoking status: Former     Types: Cigarettes     Quit date: 1970     Years since quittin.1   Substance and Sexual Activity    Alcohol use: No   Social History Narrative    [place]     Social Determinants of Health     Financial Resource Strain: Medium Risk    Difficulty of Paying Living Expenses: Somewhat hard   Food Insecurity: Food Insecurity Present    Worried About Running Out of Food in the Last Year: Often true    Ran Out of Food in the Last Year: Sometimes true   Transportation Needs: No Transportation Needs    Lack of Transportation (Medical): No    Lack of Transportation (Non-Medical): No   Physical Activity: Sufficiently Active    Days of Exercise per Week: 4 days    Minutes of Exercise per Session: 60 min   Stress: No Stress Concern Present    Feeling of Stress : Only a little   Social Connections: Moderately Integrated    Frequency of Communication with Friends and Family: More than three times a week    Frequency of Social Gatherings with Friends and Family: Never    Attends Temple Services: More than 4 times per year    Active Member of Clubs or Organizations: No     Attends Club or Organization Meetings: Never    Marital Status:    Housing Stability: Low Risk     Unable to Pay for Housing in the Last Year: No    Number of Places Lived in the Last Year: 1    Unstable Housing in the Last Year: No       Precautions:     Allergies as of 10/31/2022    (No Known Allergies)       [facility] Assessment Details/Treatment     Wound care consult received for assessment of right ankle. Patient is a 89 year old male with past medical history of HTN, cerebrovascular disease, BPH and skin cancers.    Upon assessment to bilateral lower extremities noted edema and ecchymotic discoloration. Patient reported weeping from both legs with left > right. Recommend continued managing of weeping with hina pads. Patient with onychomycosis of right great toe. Patient reported that he has pain when wearing closed toe shoes because of his toenails.     Patient to benefit from a podiatry referral for foot care. Recommend silicone foam dressing over ruptured weeping blister to R ankle to manage drainage. Tj hose currently in use. Nursing and MD team notified. Orders placed. Nursing to maintain pressure injury prevention interventions.        11/01/22 1016        Altered Skin Integrity 10/31/22 2230 Right posterior;lateral Malleolus #1 Blister(s)   Date First Assessed/Time First Assessed: 10/31/22 2230   Altered Skin Integrity Present on Admission: yes  Side: Right  Orientation: posterior;lateral  Location: Malleolus  Wound Number: #1  Is this injury device related?: No  Primary Wound Type: Blis...   Wound Image          Dressing Appearance Open to air   Drainage Amount Moderate   Drainage Characteristics/Odor Serous;No odor   Appearance Pink;Red;Moist   Periwound Area Edematous;Ecchymotic   Wound Edges Undefined   Wound Length (cm) 0.5 cm   Wound Width (cm) 0.8 cm   Wound Surface Area (cm^2) 0.4 cm^2   Care Cleansed with:;Antimicrobial agent   Dressing Applied;Silicone;Foam     Left foot       Right  foot/toes       Right toe        11/01/2022

## 2022-11-01 NOTE — PLAN OF CARE
Nutrition Plan of Care:    Recommendations    Recommendation/Intervention:   1.  Recommend aravind BID to promote wound healing    2. Encourage po intake   3. Daily weights    4. Collaboration with medical providers    Goals: Patient to consume 50% of EEN prior to RD follow up.    Nutrition Goal Status: new    Communication of RD Recs: other (comment)    Assessment and Plan    Nutrition Problem  Increased nutrition needs     Related to (etiology):   Condition associated with diagnosis     Signs and Symptoms (as evidenced by):   Delayed wound healing   Decreased po intake     Interventions/Recommendations (treatment strategy):  1.  Recommend aravind BID to promote wound healing    2. Encourage po intake   3. Daily weights    4. Collaboration with medical providers    Nutrition Diagnosis Status:   New    Ester Benavides, MS, RDN, LDN

## 2022-11-01 NOTE — ED NOTES
Pt rounding complete.  Pt is 100% on room air. In no acute distress. Restroom and comfort needs addressed-urinal at bedside.  Pt updated on plan of care.  Call light within reach.  Will continue to monitor.
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
## 2022-11-02 PROBLEM — R60.0 PERIPHERAL EDEMA: Status: ACTIVE | Noted: 2022-11-02

## 2022-11-02 PROBLEM — L03.116 CELLULITIS OF LEFT LOWER EXTREMITY: Status: ACTIVE | Noted: 2022-01-01

## 2022-11-02 NOTE — PLAN OF CARE
CM met with pt for final discharge asssessment. Pt is ready for discharge home today.    Pt confirms his son will provide transportation home. No CM needs noted.    Pt is ready for discharge home today.    11/02/22 1212   Final Note   Assessment Type Final Discharge Note   Anticipated Discharge Disposition Home   What phone number can be called within the next 1-3 days to see how you are doing after discharge? [phone]   Post-Acute Status   Discharge Delays None known at this time   [facility] - Med Surg ([place])  Discharge Final Note    Primary Care Provider: Giovani Trevino MD    Expected Discharge Date: 11/2/2022    Final Discharge Note (most recent)       Final Note - 11/02/22 1212          Final Note    Assessment Type Final Discharge Note (P)      Anticipated Discharge Disposition Home or Self Care (P)      What phone number can be called within the next 1-3 days to see how you are doing after discharge? [phone] (P)         Post-Acute Status    Discharge Delays None known at this time (P)                      Important Message from Medicare

## 2022-11-02 NOTE — DISCHARGE SUMMARY
[facility] ([place])  Cardiology  Discharge Summary      Patient Name: Dillon Dennis    Admission Date: 10/31/2022    Discharge Date and Time: 11/2/22    Final Diagnoses: cellulitis   Principal Problem: Cellulitis of left lower extremity    HPI: Mr Dennis is a 89-year-old gentleman who called with complaints of sudden onset of weeping buckets of fluid from both legs since the day of admission.  He says the day prior to admission he was on his feet for a lot of the day, working in [employer], on his damaged home.  On awakening on the day of admission am the legs were not swollen however as the day went on he started noticing swelling in his legs, and the legs started weeping, he started noticing redness swelling and pain more in the left leg than the right..     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    Impression on Admission: cellulitis, edema legs    Hospital Course:  The amiodarone was withheld, in the emergency room he was given IV furosemide.  He had a good diuresis, the following morning the swelling in his lower legs had decreased.  He was started on minocycline 100 mg p.o. b.i.d..  By the following morning he had dramatic reduction in the degree of pain and swelling in the left leg as well as the right leg.  The fluid draining in both legs had stopped.  He was able to tolerate ambulation, at the time of discharge he was placed on diltiazem 180 mg daily instead of amiodarone.  He was given a prescription for minocycline  100 mg p.o. b.i.d. to be taken for the next 5 days.  I will see him back in the office in 2 weeks.    Disposition:  Discharged to home    Follow up/Patient Instructions:    Medications:     Medication List        START taking these medications      minocycline 100 MG capsule  Commonly known as: MINOCIN,DYNACIN  Take 1 capsule (100 mg total) by mouth every 12 (twelve) hours.            CHANGE how you take these medications      atorvastatin 40 MG tablet  Commonly known as: LIPITOR  Take 1 tablet (40 mg total) by mouth once daily.  What changed: Another medication with the same name was removed. Continue taking this medication, and follow the directions you see here.     finasteride 5 mg tablet  Commonly known as: PROSCAR  Take 1 tablet (5 mg total) by mouth once daily.  What changed: Another medication with the same name was removed. Continue taking this medication, and follow the directions you see here.     tamsulosin 0.4 mg Cap  Commonly known as: FLOMAX  What changed: Another medication with the same name was removed. Continue taking this medication, and follow the directions you see here.            CONTINUE taking these medications      aspirin 81 MG EC tablet  Commonly known as: ECOTRIN     celecoxib 100 MG capsule  Commonly known as: CeleBREX  Take 1 capsule (100 mg total) by mouth 2 (two) times daily.     colchicine 0.6 mg tablet  Commonly known as: COLCRYS  Take 1 tablet (0.6 mg total) by mouth 3 (three) times daily as needed.     diltiaZEM 180 MG Cdcr  Commonly known as: DILACOR XR  Take 1 capsule (180 mg total) by mouth once daily.     ELIQUIS 2.5 mg Tab  Generic drug: apixaban  TAKE ONE TABLET BY MOUTH TWICE DAILY     esomeprazole 20 MG capsule  Commonly known as: NEXIUM     hydrocortisone valerate 0.2 % ointment  Commonly known as: WEST-ANA  Apply topically 2 (two) times daily.     losartan 50 MG tablet  Commonly known as: COZAAR  Take 1 tablet (50 mg total) by mouth once daily.     metoprolol  succinate 25 MG 24 hr tablet  Commonly known as: TOPROL-XL  Take 1 tablet (25 mg total) by mouth once daily.     omeprazole 20 MG capsule  Commonly known as: PRILOSEC     VITAMIN C 500 MG tablet  Generic drug: ascorbic acid (vitamin C)     VITAMIN D3 25 mcg (1,000 unit) capsule  Generic drug: cholecalciferol (vitamin D3)            STOP taking these medications      amLODIPine 5 MG tablet  Commonly known as: NORVASC     digoxin 125 mcg tablet  Commonly known as: LANOXIN               Where to Get Your Medications        You can get these medications from any pharmacy    Bring a paper prescription for each of these medications  minocycline 100 MG capsule        No discharge procedures on file.      Condition upon Discharge:  Much improved          Giovani Trevino MD

## 2022-11-02 NOTE — NURSING
Discharge instructions given to patient with copy of instructions,  patient verbalized all understanding of instructions. Waiting for transport, discharged home with family.